# Patient Record
Sex: FEMALE | Race: BLACK OR AFRICAN AMERICAN | NOT HISPANIC OR LATINO | Employment: OTHER | URBAN - METROPOLITAN AREA
[De-identification: names, ages, dates, MRNs, and addresses within clinical notes are randomized per-mention and may not be internally consistent; named-entity substitution may affect disease eponyms.]

---

## 2023-04-04 LAB
ALANINE AMINOTRANSFERASE (SGPT) (U/L) IN SER/PLAS: 11 U/L (ref 7–45)
ALBUMIN (G/DL) IN SER/PLAS: 3.8 G/DL (ref 3.4–5)
ALKALINE PHOSPHATASE (U/L) IN SER/PLAS: 67 U/L (ref 33–136)
ANION GAP IN SER/PLAS: 13 MMOL/L (ref 10–20)
ASPARTATE AMINOTRANSFERASE (SGOT) (U/L) IN SER/PLAS: 15 U/L (ref 9–39)
BASOPHILS (10*3/UL) IN BLOOD BY AUTOMATED COUNT: 0.03 X10E9/L (ref 0–0.1)
BASOPHILS/100 LEUKOCYTES IN BLOOD BY AUTOMATED COUNT: 1.1 % (ref 0–2)
BILIRUBIN DIRECT (MG/DL) IN SER/PLAS: 0.1 MG/DL (ref 0–0.3)
BILIRUBIN TOTAL (MG/DL) IN SER/PLAS: 0.4 MG/DL (ref 0–1.2)
CALCIUM (MG/DL) IN SER/PLAS: 8.6 MG/DL (ref 8.6–10.3)
CARBON DIOXIDE, TOTAL (MMOL/L) IN SER/PLAS: 23 MMOL/L (ref 21–32)
CHLORIDE (MMOL/L) IN SER/PLAS: 110 MMOL/L (ref 98–107)
CHOLESTEROL (MG/DL) IN SER/PLAS: 159 MG/DL (ref 0–199)
CHOLESTEROL IN HDL (MG/DL) IN SER/PLAS: 75.1 MG/DL
CHOLESTEROL/HDL RATIO: 2.1
CREATININE (MG/DL) IN SER/PLAS: 1.35 MG/DL (ref 0.5–1.05)
EOSINOPHILS (10*3/UL) IN BLOOD BY AUTOMATED COUNT: 0.04 X10E9/L (ref 0–0.7)
EOSINOPHILS/100 LEUKOCYTES IN BLOOD BY AUTOMATED COUNT: 1.4 % (ref 0–6)
ERYTHROCYTE DISTRIBUTION WIDTH (RATIO) BY AUTOMATED COUNT: 13.5 % (ref 11.5–14.5)
ERYTHROCYTE MEAN CORPUSCULAR HEMOGLOBIN CONCENTRATION (G/DL) BY AUTOMATED: 31.7 G/DL (ref 32–36)
ERYTHROCYTE MEAN CORPUSCULAR VOLUME (FL) BY AUTOMATED COUNT: 98 FL (ref 80–100)
ERYTHROCYTES (10*6/UL) IN BLOOD BY AUTOMATED COUNT: 3.64 X10E12/L (ref 4–5.2)
GFR FEMALE: 44 ML/MIN/1.73M2
GLUCOSE (MG/DL) IN SER/PLAS: 85 MG/DL (ref 74–99)
HEMATOCRIT (%) IN BLOOD BY AUTOMATED COUNT: 35.6 % (ref 36–46)
HEMOGLOBIN (G/DL) IN BLOOD: 11.3 G/DL (ref 12–16)
IMMATURE GRANULOCYTES/100 LEUKOCYTES IN BLOOD BY AUTOMATED COUNT: 0.4 % (ref 0–0.9)
LDL: 40 MG/DL (ref 0–99)
LEUKOCYTES (10*3/UL) IN BLOOD BY AUTOMATED COUNT: 2.8 X10E9/L (ref 4.4–11.3)
LYMPHOCYTES (10*3/UL) IN BLOOD BY AUTOMATED COUNT: 1.15 X10E9/L (ref 1.2–4.8)
LYMPHOCYTES/100 LEUKOCYTES IN BLOOD BY AUTOMATED COUNT: 40.9 % (ref 13–44)
MONOCYTES (10*3/UL) IN BLOOD BY AUTOMATED COUNT: 0.38 X10E9/L (ref 0.1–1)
MONOCYTES/100 LEUKOCYTES IN BLOOD BY AUTOMATED COUNT: 13.5 % (ref 2–10)
NEUTROPHILS (10*3/UL) IN BLOOD BY AUTOMATED COUNT: 1.2 X10E9/L (ref 1.2–7.7)
NEUTROPHILS/100 LEUKOCYTES IN BLOOD BY AUTOMATED COUNT: 42.7 % (ref 40–80)
NON HDL CHOLESTEROL: 84 MG/DL
PLATELETS (10*3/UL) IN BLOOD AUTOMATED COUNT: 156 X10E9/L (ref 150–450)
POTASSIUM (MMOL/L) IN SER/PLAS: 4.2 MMOL/L (ref 3.5–5.3)
PROTEIN TOTAL: 6.5 G/DL (ref 6.4–8.2)
SODIUM (MMOL/L) IN SER/PLAS: 142 MMOL/L (ref 136–145)
THYROTROPIN (MIU/L) IN SER/PLAS BY DETECTION LIMIT <= 0.05 MIU/L: 0.22 MIU/L (ref 0.44–3.98)
TRIGLYCERIDE (MG/DL) IN SER/PLAS: 222 MG/DL (ref 0–149)
UREA NITROGEN (MG/DL) IN SER/PLAS: 26 MG/DL (ref 6–23)
VLDL: 44 MG/DL (ref 0–40)

## 2023-04-28 LAB
ANION GAP IN SER/PLAS: 12 MMOL/L (ref 10–20)
BASOPHILS (10*3/UL) IN BLOOD BY AUTOMATED COUNT: 0.03 X10E9/L (ref 0–0.1)
BASOPHILS/100 LEUKOCYTES IN BLOOD BY AUTOMATED COUNT: 0.7 % (ref 0–2)
CALCIUM (MG/DL) IN SER/PLAS: 9 MG/DL (ref 8.6–10.3)
CARBON DIOXIDE, TOTAL (MMOL/L) IN SER/PLAS: 27 MMOL/L (ref 21–32)
CHLORIDE (MMOL/L) IN SER/PLAS: 104 MMOL/L (ref 98–107)
CREATININE (MG/DL) IN SER/PLAS: 1.17 MG/DL (ref 0.5–1.05)
EOSINOPHILS (10*3/UL) IN BLOOD BY AUTOMATED COUNT: 0.01 X10E9/L (ref 0–0.7)
EOSINOPHILS/100 LEUKOCYTES IN BLOOD BY AUTOMATED COUNT: 0.2 % (ref 0–6)
ERYTHROCYTE DISTRIBUTION WIDTH (RATIO) BY AUTOMATED COUNT: 13.2 % (ref 11.5–14.5)
ERYTHROCYTE MEAN CORPUSCULAR HEMOGLOBIN CONCENTRATION (G/DL) BY AUTOMATED: 32.2 G/DL (ref 32–36)
ERYTHROCYTE MEAN CORPUSCULAR VOLUME (FL) BY AUTOMATED COUNT: 96 FL (ref 80–100)
ERYTHROCYTES (10*6/UL) IN BLOOD BY AUTOMATED COUNT: 3.85 X10E12/L (ref 4–5.2)
GFR FEMALE: 52 ML/MIN/1.73M2
GLUCOSE (MG/DL) IN SER/PLAS: 127 MG/DL (ref 74–99)
HEMATOCRIT (%) IN BLOOD BY AUTOMATED COUNT: 36.9 % (ref 36–46)
HEMOGLOBIN (G/DL) IN BLOOD: 11.9 G/DL (ref 12–16)
IMMATURE GRANULOCYTES/100 LEUKOCYTES IN BLOOD BY AUTOMATED COUNT: 0.2 % (ref 0–0.9)
LEUKOCYTES (10*3/UL) IN BLOOD BY AUTOMATED COUNT: 4.2 X10E9/L (ref 4.4–11.3)
LYMPHOCYTES (10*3/UL) IN BLOOD BY AUTOMATED COUNT: 1.35 X10E9/L (ref 1.2–4.8)
LYMPHOCYTES/100 LEUKOCYTES IN BLOOD BY AUTOMATED COUNT: 32.5 % (ref 13–44)
MONOCYTES (10*3/UL) IN BLOOD BY AUTOMATED COUNT: 0.35 X10E9/L (ref 0.1–1)
MONOCYTES/100 LEUKOCYTES IN BLOOD BY AUTOMATED COUNT: 8.4 % (ref 2–10)
NEUTROPHILS (10*3/UL) IN BLOOD BY AUTOMATED COUNT: 2.41 X10E9/L (ref 1.2–7.7)
NEUTROPHILS/100 LEUKOCYTES IN BLOOD BY AUTOMATED COUNT: 58 % (ref 40–80)
PLATELETS (10*3/UL) IN BLOOD AUTOMATED COUNT: 188 X10E9/L (ref 150–450)
POTASSIUM (MMOL/L) IN SER/PLAS: 3.6 MMOL/L (ref 3.5–5.3)
SODIUM (MMOL/L) IN SER/PLAS: 139 MMOL/L (ref 136–145)
THYROTROPIN (MIU/L) IN SER/PLAS BY DETECTION LIMIT <= 0.05 MIU/L: 0.9 MIU/L (ref 0.44–3.98)
UREA NITROGEN (MG/DL) IN SER/PLAS: 19 MG/DL (ref 6–23)

## 2023-08-24 LAB
ANION GAP IN SER/PLAS: 12 MMOL/L (ref 10–20)
CALCIUM (MG/DL) IN SER/PLAS: 8.8 MG/DL (ref 8.6–10.3)
CARBON DIOXIDE, TOTAL (MMOL/L) IN SER/PLAS: 23 MMOL/L (ref 21–32)
CHLORIDE (MMOL/L) IN SER/PLAS: 107 MMOL/L (ref 98–107)
CREATININE (MG/DL) IN SER/PLAS: 1.07 MG/DL (ref 0.5–1.05)
GFR FEMALE: 57 ML/MIN/1.73M2
GLUCOSE (MG/DL) IN SER/PLAS: 98 MG/DL (ref 74–99)
POTASSIUM (MMOL/L) IN SER/PLAS: 3.7 MMOL/L (ref 3.5–5.3)
SODIUM (MMOL/L) IN SER/PLAS: 138 MMOL/L (ref 136–145)
UREA NITROGEN (MG/DL) IN SER/PLAS: 19 MG/DL (ref 6–23)

## 2023-08-25 LAB
ALANINE AMINOTRANSFERASE (SGPT) (U/L) IN SER/PLAS: 7 U/L (ref 7–45)
ALBUMIN (G/DL) IN SER/PLAS: 3.7 G/DL (ref 3.4–5)
ALKALINE PHOSPHATASE (U/L) IN SER/PLAS: 60 U/L (ref 33–136)
ANION GAP IN SER/PLAS: 13 MMOL/L (ref 10–20)
ASPARTATE AMINOTRANSFERASE (SGOT) (U/L) IN SER/PLAS: 12 U/L (ref 9–39)
BASOPHILS (10*3/UL) IN BLOOD BY AUTOMATED COUNT: 0.02 X10E9/L (ref 0–0.1)
BASOPHILS/100 LEUKOCYTES IN BLOOD BY AUTOMATED COUNT: 0.4 % (ref 0–2)
BILIRUBIN DIRECT (MG/DL) IN SER/PLAS: 0.2 MG/DL (ref 0–0.3)
BILIRUBIN TOTAL (MG/DL) IN SER/PLAS: 0.8 MG/DL (ref 0–1.2)
CALCIUM (MG/DL) IN SER/PLAS: 8.6 MG/DL (ref 8.6–10.3)
CARBON DIOXIDE, TOTAL (MMOL/L) IN SER/PLAS: 23 MMOL/L (ref 21–32)
CHLORIDE (MMOL/L) IN SER/PLAS: 106 MMOL/L (ref 98–107)
CHOLESTEROL (MG/DL) IN SER/PLAS: 148 MG/DL (ref 0–199)
CHOLESTEROL IN HDL (MG/DL) IN SER/PLAS: 64.8 MG/DL
CHOLESTEROL/HDL RATIO: 2.3
CREATININE (MG/DL) IN SER/PLAS: 0.97 MG/DL (ref 0.5–1.05)
EOSINOPHILS (10*3/UL) IN BLOOD BY AUTOMATED COUNT: 0.07 X10E9/L (ref 0–0.7)
EOSINOPHILS/100 LEUKOCYTES IN BLOOD BY AUTOMATED COUNT: 1.5 % (ref 0–6)
ERYTHROCYTE DISTRIBUTION WIDTH (RATIO) BY AUTOMATED COUNT: 13.2 % (ref 11.5–14.5)
ERYTHROCYTE MEAN CORPUSCULAR HEMOGLOBIN CONCENTRATION (G/DL) BY AUTOMATED: 32.4 G/DL (ref 32–36)
ERYTHROCYTE MEAN CORPUSCULAR VOLUME (FL) BY AUTOMATED COUNT: 98 FL (ref 80–100)
ERYTHROCYTES (10*6/UL) IN BLOOD BY AUTOMATED COUNT: 3.69 X10E12/L (ref 4–5.2)
GFR FEMALE: 65 ML/MIN/1.73M2
GLUCOSE (MG/DL) IN SER/PLAS: 116 MG/DL (ref 74–99)
HEMATOCRIT (%) IN BLOOD BY AUTOMATED COUNT: 36.1 % (ref 36–46)
HEMOGLOBIN (G/DL) IN BLOOD: 11.7 G/DL (ref 12–16)
IMMATURE GRANULOCYTES/100 LEUKOCYTES IN BLOOD BY AUTOMATED COUNT: 0.2 % (ref 0–0.9)
LDL: 66 MG/DL (ref 0–99)
LEUKOCYTES (10*3/UL) IN BLOOD BY AUTOMATED COUNT: 4.7 X10E9/L (ref 4.4–11.3)
LYMPHOCYTES (10*3/UL) IN BLOOD BY AUTOMATED COUNT: 1.58 X10E9/L (ref 1.2–4.8)
LYMPHOCYTES/100 LEUKOCYTES IN BLOOD BY AUTOMATED COUNT: 33.5 % (ref 13–44)
MONOCYTES (10*3/UL) IN BLOOD BY AUTOMATED COUNT: 0.5 X10E9/L (ref 0.1–1)
MONOCYTES/100 LEUKOCYTES IN BLOOD BY AUTOMATED COUNT: 10.6 % (ref 2–10)
NEUTROPHILS (10*3/UL) IN BLOOD BY AUTOMATED COUNT: 2.53 X10E9/L (ref 1.2–7.7)
NEUTROPHILS/100 LEUKOCYTES IN BLOOD BY AUTOMATED COUNT: 53.8 % (ref 40–80)
PLATELETS (10*3/UL) IN BLOOD AUTOMATED COUNT: 162 X10E9/L (ref 150–450)
POTASSIUM (MMOL/L) IN SER/PLAS: 3.6 MMOL/L (ref 3.5–5.3)
PROTEIN TOTAL: 6.4 G/DL (ref 6.4–8.2)
SODIUM (MMOL/L) IN SER/PLAS: 138 MMOL/L (ref 136–145)
THYROTROPIN (MIU/L) IN SER/PLAS BY DETECTION LIMIT <= 0.05 MIU/L: 1.1 MIU/L (ref 0.44–3.98)
TRIGLYCERIDE (MG/DL) IN SER/PLAS: 88 MG/DL (ref 0–149)
UREA NITROGEN (MG/DL) IN SER/PLAS: 18 MG/DL (ref 6–23)
VLDL: 18 MG/DL (ref 0–40)

## 2023-12-04 PROBLEM — I10 HYPERTENSION: Status: ACTIVE | Noted: 2023-12-04

## 2023-12-04 PROBLEM — R10.2 FEMALE PELVIC PAIN: Status: ACTIVE | Noted: 2023-12-04

## 2023-12-04 PROBLEM — K58.0 IRRITABLE BOWEL SYNDROME WITH DIARRHEA: Status: ACTIVE | Noted: 2023-12-04

## 2023-12-04 PROBLEM — R93.1 ELEVATED CORONARY ARTERY CALCIUM SCORE: Status: ACTIVE | Noted: 2023-12-04

## 2023-12-04 PROBLEM — M54.42 ACUTE BILATERAL LOW BACK PAIN WITH BILATERAL SCIATICA: Status: ACTIVE | Noted: 2023-12-04

## 2023-12-04 PROBLEM — H25.812 COMBINED FORM OF AGE-RELATED CATARACT, LEFT EYE: Status: ACTIVE | Noted: 2023-12-04

## 2023-12-04 PROBLEM — L70.0 ACNE VULGARIS: Status: ACTIVE | Noted: 2022-02-09

## 2023-12-04 PROBLEM — Z90.13 S/P BILATERAL MASTECTOMY: Status: ACTIVE | Noted: 2023-12-04

## 2023-12-04 PROBLEM — Z98.82 HISTORY OF BILATERAL BREAST IMPLANTS: Status: ACTIVE | Noted: 2023-12-04

## 2023-12-04 PROBLEM — H40.003 GLAUCOMA SUSPECT OF BOTH EYES: Status: ACTIVE | Noted: 2023-12-04

## 2023-12-04 PROBLEM — I34.0 MITRAL REGURGITATION: Status: ACTIVE | Noted: 2023-12-04

## 2023-12-04 PROBLEM — R10.9 ABDOMINAL PAIN: Status: ACTIVE | Noted: 2023-12-04

## 2023-12-04 PROBLEM — R19.7 DIARRHEA: Status: ACTIVE | Noted: 2023-12-04

## 2023-12-04 PROBLEM — H25.811 COMBINED FORM OF AGE-RELATED CATARACT, RIGHT EYE: Status: ACTIVE | Noted: 2023-12-04

## 2023-12-04 PROBLEM — R06.09 DYSPNEA ON EXERTION: Status: ACTIVE | Noted: 2023-12-04

## 2023-12-04 PROBLEM — M54.41 ACUTE BILATERAL LOW BACK PAIN WITH BILATERAL SCIATICA: Status: ACTIVE | Noted: 2023-12-04

## 2023-12-04 PROBLEM — D22.39 MELANOCYTIC NEVI OF OTHER PARTS OF FACE: Status: ACTIVE | Noted: 2022-02-09

## 2023-12-04 PROBLEM — H04.123 DRY EYES, BILATERAL: Status: ACTIVE | Noted: 2023-12-04

## 2023-12-04 PROBLEM — J34.89 NASAL CRUSTING: Status: ACTIVE | Noted: 2023-12-04

## 2023-12-04 PROBLEM — K52.9 CHRONIC DIARRHEA: Status: ACTIVE | Noted: 2023-12-04

## 2023-12-04 PROBLEM — D22.30 NEVUS OF OTA: Status: ACTIVE | Noted: 2023-12-04

## 2023-12-04 PROBLEM — S33.5XXA LUMBAR SPRAIN: Status: ACTIVE | Noted: 2023-12-04

## 2023-12-04 PROBLEM — R04.0 EPISTAXIS: Status: ACTIVE | Noted: 2023-12-04

## 2023-12-04 PROBLEM — Z96.1 PSEUDOPHAKIA OF RIGHT EYE: Status: ACTIVE | Noted: 2023-12-04

## 2023-12-04 PROBLEM — Z96.1 PSEUDOPHAKIA OF LEFT EYE: Status: ACTIVE | Noted: 2023-12-04

## 2023-12-04 PROBLEM — N60.19 BREAST CHANGES, FIBROCYSTIC: Status: ACTIVE | Noted: 2023-12-04

## 2023-12-04 PROBLEM — H52.209 ASTIGMATISM: Status: ACTIVE | Noted: 2023-12-04

## 2023-12-04 PROBLEM — H52.10 MYOPIA: Status: ACTIVE | Noted: 2023-12-04

## 2023-12-04 PROBLEM — I50.30 DIASTOLIC CHF (MULTI): Status: ACTIVE | Noted: 2023-12-04

## 2023-12-04 PROBLEM — R51.9 HEADACHE: Status: ACTIVE | Noted: 2023-12-04

## 2023-12-04 PROBLEM — K29.70 GASTRITIS: Status: ACTIVE | Noted: 2023-12-04

## 2023-12-04 PROBLEM — Z15.01: Status: ACTIVE | Noted: 2023-12-04

## 2023-12-04 PROBLEM — H35.369 RETINAL DRUSEN: Status: ACTIVE | Noted: 2023-12-04

## 2023-12-04 PROBLEM — M62.89 PELVIC FLOOR DYSFUNCTION: Status: ACTIVE | Noted: 2023-12-04

## 2023-12-04 PROBLEM — N95.2 VAGINAL ATROPHY: Status: ACTIVE | Noted: 2023-12-04

## 2023-12-04 PROBLEM — Z15.09: Status: ACTIVE | Noted: 2023-12-04

## 2023-12-04 PROBLEM — N39.0 RECURRENT UTI: Status: ACTIVE | Noted: 2023-12-04

## 2023-12-04 PROBLEM — N30.01 ACUTE CYSTITIS WITH HEMATURIA: Status: ACTIVE | Noted: 2023-12-04

## 2023-12-04 RX ORDER — CLONIDINE 0.3 MG/24H
1 PATCH, EXTENDED RELEASE TRANSDERMAL
COMMUNITY
Start: 2018-03-01

## 2023-12-04 RX ORDER — ESTRADIOL 10 UG/1
10 INSERT VAGINAL DAILY
COMMUNITY
Start: 2023-09-27 | End: 2024-03-06 | Stop reason: SDUPTHER

## 2023-12-04 RX ORDER — METOPROLOL SUCCINATE 100 MG/1
100 TABLET, EXTENDED RELEASE ORAL DAILY
COMMUNITY
Start: 2014-12-17

## 2023-12-04 RX ORDER — HYDROCORTISONE 25 MG/G
1 CREAM TOPICAL AS NEEDED
COMMUNITY
Start: 2023-08-31

## 2023-12-04 RX ORDER — NAPROXEN 500 MG/1
500 TABLET ORAL EVERY 12 HOURS
COMMUNITY
Start: 2022-06-02

## 2023-12-04 RX ORDER — PREDNISONE 10 MG/1
10 TABLET ORAL DAILY
COMMUNITY
Start: 2023-03-15

## 2023-12-04 RX ORDER — TRETINOIN 0.25 MG/G
1 CREAM TOPICAL NIGHTLY
COMMUNITY
Start: 2022-02-09

## 2023-12-04 RX ORDER — AMLODIPINE BESYLATE 10 MG/1
10 TABLET ORAL DAILY
COMMUNITY
End: 2023-12-11

## 2023-12-04 RX ORDER — CHOLECALCIFEROL (VITAMIN D3) 50 MCG
2000 TABLET ORAL DAILY
COMMUNITY
Start: 2019-04-16

## 2023-12-09 DIAGNOSIS — I10 HYPERTENSION, UNSPECIFIED TYPE: Primary | ICD-10-CM

## 2023-12-11 ENCOUNTER — TELEPHONE (OUTPATIENT)
Dept: OBSTETRICS AND GYNECOLOGY | Facility: CLINIC | Age: 68
End: 2023-12-11
Payer: COMMERCIAL

## 2023-12-11 RX ORDER — AMLODIPINE BESYLATE 10 MG/1
10 TABLET ORAL DAILY
Qty: 90 TABLET | Refills: 0 | Status: SHIPPED | OUTPATIENT
Start: 2023-12-11
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
## 2023-12-14 ENCOUNTER — APPOINTMENT (OUTPATIENT)
Dept: SURGICAL ONCOLOGY | Facility: CLINIC | Age: 68
End: 2023-12-14
Payer: COMMERCIAL

## 2023-12-15 ENCOUNTER — OFFICE VISIT (OUTPATIENT)
Dept: SURGICAL ONCOLOGY | Facility: CLINIC | Age: 68
End: 2023-12-15
Payer: COMMERCIAL

## 2023-12-15 ENCOUNTER — OFFICE VISIT (OUTPATIENT)
Dept: CARDIOLOGY | Facility: CLINIC | Age: 68
End: 2023-12-15
Payer: COMMERCIAL

## 2023-12-15 VITALS
HEIGHT: 67 IN | OXYGEN SATURATION: 98 % | WEIGHT: 168 LBS | HEART RATE: 69 BPM | SYSTOLIC BLOOD PRESSURE: 110 MMHG | BODY MASS INDEX: 26.37 KG/M2 | DIASTOLIC BLOOD PRESSURE: 64 MMHG

## 2023-12-15 VITALS
SYSTOLIC BLOOD PRESSURE: 127 MMHG | DIASTOLIC BLOOD PRESSURE: 82 MMHG | WEIGHT: 170.75 LBS | HEART RATE: 73 BPM | HEIGHT: 67 IN | BODY MASS INDEX: 26.8 KG/M2

## 2023-12-15 DIAGNOSIS — Z15.09 BRCA2 POSITIVE: Primary | ICD-10-CM

## 2023-12-15 DIAGNOSIS — Z15.01 BRCA2 POSITIVE: Primary | ICD-10-CM

## 2023-12-15 DIAGNOSIS — I50.32 CHRONIC DIASTOLIC CONGESTIVE HEART FAILURE (MULTI): ICD-10-CM

## 2023-12-15 DIAGNOSIS — I10 WELL-CONTROLLED HYPERTENSION: Primary | ICD-10-CM

## 2023-12-15 PROBLEM — D25.9 UTERINE LEIOMYOMA: Status: ACTIVE | Noted: 2023-12-15

## 2023-12-15 PROBLEM — N18.30 CKD (CHRONIC KIDNEY DISEASE) STAGE 3, GFR 30-59 ML/MIN (MULTI): Status: ACTIVE | Noted: 2018-03-12

## 2023-12-15 PROCEDURE — 99213 OFFICE O/P EST LOW 20 MIN: CPT | Performed by: NURSE PRACTITIONER

## 2023-12-15 PROCEDURE — 3078F DIAST BP <80 MM HG: CPT

## 2023-12-15 PROCEDURE — 1036F TOBACCO NON-USER: CPT | Performed by: NURSE PRACTITIONER

## 2023-12-15 PROCEDURE — 1126F AMNT PAIN NOTED NONE PRSNT: CPT | Performed by: NURSE PRACTITIONER

## 2023-12-15 PROCEDURE — 3074F SYST BP LT 130 MM HG: CPT

## 2023-12-15 PROCEDURE — 3079F DIAST BP 80-89 MM HG: CPT | Performed by: NURSE PRACTITIONER

## 2023-12-15 PROCEDURE — 1036F TOBACCO NON-USER: CPT

## 2023-12-15 PROCEDURE — 99214 OFFICE O/P EST MOD 30 MIN: CPT

## 2023-12-15 PROCEDURE — 1159F MED LIST DOCD IN RCRD: CPT | Performed by: NURSE PRACTITIONER

## 2023-12-15 PROCEDURE — 3074F SYST BP LT 130 MM HG: CPT | Performed by: NURSE PRACTITIONER

## 2023-12-15 PROCEDURE — 1125F AMNT PAIN NOTED PAIN PRSNT: CPT

## 2023-12-15 PROCEDURE — 1160F RVW MEDS BY RX/DR IN RCRD: CPT | Performed by: NURSE PRACTITIONER

## 2023-12-15 PROCEDURE — 1159F MED LIST DOCD IN RCRD: CPT

## 2023-12-15 ASSESSMENT — PAIN SCALES - GENERAL: PAINLEVEL: 0-NO PAIN

## 2023-12-15 NOTE — PROGRESS NOTES
"Chief Complaint:   Follow-up     History Of Present Illness:    Dian Gilliam is a 69 y/o with a PMH of HFpEF 70% on echocardiogram 3/9/22, HTN, serous carcinoma of the fallopian tube( post op bowel obstruction).      She reports to feeling well. BP has been well controlled at home. She requested a refill for clonidine patch.  She has not taken it for over 1 week. Patient denies chest pain and angina.  Pt denies shortness of breath, dyspnea on exertion, orthopnea, and paroxysmal nocturnal dyspnea.  Pt denies worsening lower extremity edema.  Pt denies palpitations or syncope.  No recent falls.  No fever or chills.  No cough.  No change in bowel or bladder habits.  No sick contacts.  No recent travel.     ROS: 12 point review of systems including (Constitutional, Eyes, ENMT, Respiratory, Cardiac, Gastrointestinal, Neurological, Psychiatric, and Hematologic) was performed and is otherwise negative unless noted in HPI.         Last Recorded Vitals:  Vitals:    12/15/23 1114   BP: 110/64   Pulse: 69   SpO2: 98%   Weight: 76.2 kg (168 lb)   Height: 1.702 m (5' 7\")       Past Medical History:  She has a past medical history of Encounter for general adult medical examination without abnormal findings (09/18/2021), Malignant neoplasm of unspecified fallopian tube (CMS/HCC) (03/25/2015), Personal history of other benign neoplasm, and Personal history of other diseases of urinary system.    Past Surgical History:  She has a past surgical history that includes Exploratory laparotomy (12/31/2015); Cholecystectomy (12/31/2015); Small intestine surgery (06/03/2019); Total abdominal hysterectomy w/ bilateral salpingoophorectomy (06/03/2019); Other surgical history (03/23/2017); US guided abdominal paracentesis (5/23/2013); and US guided abscess drain (5/28/2013).      Social History:  She reports that she has never smoked. She has never used smokeless tobacco. No history on file for alcohol use and drug use.    Family History:  No " family history on file.     Allergies:  Patient has no known allergies.    Outpatient Medications:  Current Outpatient Medications   Medication Instructions    amLODIPine (NORVASC) 10 mg, oral, Daily    cholecalciferol (VITAMIN D-3) 2,000 Units, oral, Daily    cloNIDine (Catapres-TTS) 0.3 mg/24 hr patch 1 patch, transdermal, Weekly    estradiol (VAGIFEM) 10 mcg, oral, Daily    hydrocortisone 2.5 % cream 1 Application, Topical, As needed    lisinopril 40 mg, oral, Daily    metoprolol succinate XL (TOPROL-XL) 100 mg, oral, Daily    naproxen (NAPROSYN) 500 mg, oral, Every 12 hours    predniSONE (DELTASONE) 10 mg, oral, Daily    tretinoin (Retin-A) 0.025 % cream 1 Application, Topical, Nightly       Physical Exam  Constitutional:       Appearance: Normal appearance.   Neck:      Vascular: No carotid bruit.   Cardiovascular:      Rate and Rhythm: Normal rate and regular rhythm.      Heart sounds: No murmur heard.  Pulmonary:      Effort: Pulmonary effort is normal.      Breath sounds: Normal breath sounds.   Abdominal:      Palpations: Abdomen is soft.   Skin:     General: Skin is warm.   Neurological:      Mental Status: She is alert and oriented to person, place, and time.   Psychiatric:         Mood and Affect: Mood normal.       Last Labs:  CBC -  Lab Results   Component Value Date    WBC 4.7 08/25/2023    HGB 11.7 (L) 08/25/2023    HCT 36.1 08/25/2023    MCV 98 08/25/2023     08/25/2023       CMP -  Lab Results   Component Value Date    CALCIUM 8.6 08/25/2023    PHOS 3.4 10/12/2020    PROT 6.4 08/25/2023    ALBUMIN 3.7 08/25/2023    ALBUMIN 4.2 04/22/2022    AST 12 08/25/2023    ALT 7 08/25/2023    ALKPHOS 60 08/25/2023    BILITOT 0.8 08/25/2023       LIPID PANEL -   Lab Results   Component Value Date    CHOL 148 08/25/2023    TRIG 88 08/25/2023    HDL 64.8 08/25/2023    CHHDL 2.3 08/25/2023    CHHDL 2.0 04/22/2022    LDLF 66 08/25/2023    VLDL 18 08/25/2023    NHDL 84 04/04/2023       RENAL FUNCTION PANEL -    Lab Results   Component Value Date    GLUCOSE 116 (H) 08/25/2023     08/25/2023    K 3.6 08/25/2023     08/25/2023    CO2 23 08/25/2023    ANIONGAP 13 08/25/2023    BUN 18 08/25/2023    CREATININE 0.97 08/25/2023    CALCIUM 8.6 08/25/2023    PHOS 3.4 10/12/2020    ALBUMIN 3.7 08/25/2023    ALBUMIN 4.2 04/22/2022        Lab Results   Component Value Date    HGBA1C 5.2 04/22/2022       Last Cardiology Tests:  Echo:  3/22/2022 CONCLUSIONS:   1. The left ventricular systolic function is normal with a 70% estimated ejection fraction.   2. Spectral Doppler shows an impaired relaxation pattern of left ventricular diastolic filling.     Stress Test:  Normal Exercise Stress Test 2019  Cardiac Imaging:  CT Cardiac Calcium Score 156 5/7/2019    Assessment/Plan   Dian Gilliam is a 67 y/o with a PMH of HFpEF 70% on echocardiogram 3/9/22, HTN, serous carcinoma of the fallopian tube( post op bowel obstruction).      She reports to feeling well. BP has been well controlled at home. She requested a refill for clonidine patch.  She has not taken it for over 1 week. BP is normotensive today . I advised patient that at this time there is not a need for the clonidine and the risk of taking it can cause her to be hypotensive. She appears euvolemic and does not have cardiac complaints.     Continue taking Amlodipine 10 mg daily, lisinopril 40 mg daily, metoprolol succinate 100 mg daily,     Follow up with PCP    Follow up with Cardiology in 1 year      Ailyn Valencia, PEDRO-CNP

## 2023-12-15 NOTE — PATIENT INSTRUCTIONS
Dian,    Stop clonidine 0.3 mg patch at this time BP is 110/64 . No need to restart.    Your cholesterol is controlled     Continue taking Amlodipine 10 mg daily, lisinopril 40 mg daily, metoprolol succinate 100 mg daily,     Follow up with PCP    Follow up with Cardiology in 1 year    Ailyn VASQUEZ-CNP

## 2023-12-17 ASSESSMENT — CUP TO DISC RATIO
OS_RATIO: 0.4
OD_RATIO: .65

## 2023-12-17 ASSESSMENT — SLIT LAMP EXAM - LIDS
COMMENTS: GOOD POSITION
COMMENTS: GOOD POSITION

## 2023-12-17 ASSESSMENT — EXTERNAL EXAM - LEFT EYE: OS_EXAM: NORMAL

## 2023-12-18 ENCOUNTER — OFFICE VISIT (OUTPATIENT)
Dept: OPHTHALMOLOGY | Facility: CLINIC | Age: 68
End: 2023-12-18
Payer: COMMERCIAL

## 2023-12-18 DIAGNOSIS — H52.4 PRESBYOPIA: ICD-10-CM

## 2023-12-18 DIAGNOSIS — H40.003 GLAUCOMA SUSPECT OF BOTH EYES: Primary | ICD-10-CM

## 2023-12-18 DIAGNOSIS — H26.492 PCO (POSTERIOR CAPSULAR OPACIFICATION), LEFT: ICD-10-CM

## 2023-12-18 DIAGNOSIS — H04.123 DRY EYES, BILATERAL: ICD-10-CM

## 2023-12-18 DIAGNOSIS — H52.203 ASTIGMATISM OF BOTH EYES, UNSPECIFIED TYPE: ICD-10-CM

## 2023-12-18 DIAGNOSIS — H35.363 RETINAL DRUSEN OF BOTH EYES: ICD-10-CM

## 2023-12-18 DIAGNOSIS — Z96.1 PSEUDOPHAKIA: ICD-10-CM

## 2023-12-18 DIAGNOSIS — D22.30 NEVUS OF OTA: ICD-10-CM

## 2023-12-18 DIAGNOSIS — H52.13 MYOPIA OF BOTH EYES: ICD-10-CM

## 2023-12-18 PROCEDURE — 92015 DETERMINE REFRACTIVE STATE: CPT | Performed by: OPHTHALMOLOGY

## 2023-12-18 PROCEDURE — 92014 COMPRE OPH EXAM EST PT 1/>: CPT | Performed by: OPHTHALMOLOGY

## 2023-12-18 PROCEDURE — 92133 CPTRZD OPH DX IMG PST SGM ON: CPT | Performed by: OPHTHALMOLOGY

## 2023-12-18 ASSESSMENT — ENCOUNTER SYMPTOMS
CONSTITUTIONAL NEGATIVE: 0
NEUROLOGICAL NEGATIVE: 0
MUSCULOSKELETAL NEGATIVE: 0
GASTROINTESTINAL NEGATIVE: 0
PSYCHIATRIC NEGATIVE: 0
RESPIRATORY NEGATIVE: 0
ALLERGIC/IMMUNOLOGIC NEGATIVE: 0
EYES NEGATIVE: 1
CARDIOVASCULAR NEGATIVE: 0
HEMATOLOGIC/LYMPHATIC NEGATIVE: 0
ENDOCRINE NEGATIVE: 0

## 2023-12-18 ASSESSMENT — REFRACTION_MANIFEST
OD_AXIS: 135
OS_SPHERE: -2.00
OS_AXIS: 085
OD_CYLINDER: -0.50
OD_SPHERE: -1.75
OS_CYLINDER: -0.50
OD_ADD: +2.50
OS_ADD: +2.50

## 2023-12-18 ASSESSMENT — PACHYMETRY
OS_CT(UM): 529
OD_CT(UM): 554

## 2023-12-18 ASSESSMENT — REFRACTION_WEARINGRX
OS_CYLINDER: -0.50
OD_AXIS: 135
OS_AXIS: 085
OS_SPHERE: -2.00
OD_SPHERE: -1.75
OD_CYLINDER: -0.50
OS_ADD: +2.50
OD_ADD: +2.50

## 2023-12-18 ASSESSMENT — VISUAL ACUITY
CORRECTION_TYPE: GLASSES
OS_CC: 20/20
METHOD: SNELLEN - LINEAR
OD_CC: 20/20

## 2023-12-18 ASSESSMENT — TONOMETRY
IOP_METHOD: GOLDMANN APPLANATION
OS_IOP_MMHG: 18
OD_IOP_MMHG: 19

## 2023-12-18 NOTE — PROGRESS NOTES
Glaucoma suspect of both eyesH40.003  Nevus of OtaD22.30  -No known FH of glaucoma.  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 Will monitor.  -Pachymetry (5/20/19) - 554/529  -HVF 24-2 (8/30/21) - OD: 8/14FL 20%FP. WNL. OS: 12/13FL 30%FP 12%FN. WNL. Unreliable OU, but WNL and stable from 5/20/19.   -OCT RNFL (12/18/23) - SS: 6/10 OD and 7/10 OS. OD: WNL. OS: Bord IT. 90/91. Stable from 8/30/20.  -Gonioscopy (08/30/2021) - Open to TM OU, pigment OD>OS.   -Plan: Exam and testing stable. Low suspicion for glaucoma at this time. No treatment necessary. F/u 1 year with comprehensive exam, repeat OCT RNFL OU, HVF 24-2, and gonioscopy OU.    Retinal eulntlS12.369  -OCT macula (12/18/23) - SS: 8/10 OD and 7/10 OS. Normal thickness and contour OU. Intact IS-OS OU. No edema OU. 260/269. Stable from 12/2/22.   -Not visually significant. No elevation/SRF.  -Monitor with annual dilated exam.     Dry eyes, eedmznhwrJ96.123  -Occasional irritation and itching. No history of seasonal allergies. Advised to use artificial tears (Refresh) PRN. Add warm compresses PRN.     Pseudophakia of right eyeZ96.1 - 1/17/19 - Complex with Trypan Blue and Malyugin Ring  PCO (posterior capsular opacification), leftH26.492  Pseudophakia of left eyeZ96.1 - 12/13/18 - Complex with Trypan Blue  -Doing well. Good vision. IOP good.  -Trace PCO OS - stable, not visually significant.     MnmxfjQ40.10  LlrdhjgtpvdB53.209  ThxnmzajzpW73.4  -New Rx given per patient request.

## 2023-12-20 NOTE — TELEPHONE ENCOUNTER
Sent [employer] message and left phone message for Dian Gilliam asking for name of med to be refilled.

## 2024-03-06 ENCOUNTER — TELEPHONE (OUTPATIENT)
Dept: OBSTETRICS AND GYNECOLOGY | Facility: CLINIC | Age: 69
End: 2024-03-06
Payer: COMMERCIAL

## 2024-03-06 DIAGNOSIS — N95.2 VAGINAL ATROPHY: ICD-10-CM

## 2024-03-06 NOTE — TELEPHONE ENCOUNTER
Request received for   Requested Prescriptions     Pending Prescriptions Disp Refills    estradiol (Vagifem) 10 mcg tablet vaginal tablet 90 tablet 0     Sig: Insert 1 tablet (10 mcg) into the vagina once daily.       Dian Gilliam was last seen 8/28/2023 and has an appt for Visit date not found

## 2024-03-07 ENCOUNTER — TELEPHONE (OUTPATIENT)
Dept: OBSTETRICS AND GYNECOLOGY | Facility: CLINIC | Age: 69
End: 2024-03-07
Payer: COMMERCIAL

## 2024-03-07 DIAGNOSIS — N95.2 VAGINAL ATROPHY: ICD-10-CM

## 2024-03-07 RX ORDER — ESTRADIOL 10 UG/1
10 INSERT VAGINAL DAILY
Qty: 90 TABLET | Refills: 0 | Status: SHIPPED | OUTPATIENT
Start: 2024-03-07 | End: 2024-03-07 | Stop reason: SDUPTHER

## 2024-03-07 RX ORDER — ESTRADIOL 10 UG/1
10 INSERT VAGINAL 2 TIMES WEEKLY
Qty: 24 TABLET | Refills: 3 | Status: SHIPPED | OUTPATIENT
Start: 2024-03-07

## 2024-04-17 ENCOUNTER — LAB (OUTPATIENT)
Dept: LAB | Facility: LAB | Age: 69
End: 2024-04-17
Payer: COMMERCIAL

## 2024-04-17 DIAGNOSIS — E78.5 HYPERLIPIDEMIA, UNSPECIFIED: ICD-10-CM

## 2024-04-17 DIAGNOSIS — I10 ESSENTIAL (PRIMARY) HYPERTENSION: Primary | ICD-10-CM

## 2024-04-17 LAB
ALBUMIN SERPL BCP-MCNC: 4 G/DL (ref 3.4–5)
ALP SERPL-CCNC: 88 U/L (ref 33–136)
ALT SERPL W P-5'-P-CCNC: 13 U/L (ref 7–45)
ANION GAP SERPL CALC-SCNC: 15 MMOL/L (ref 10–20)
AST SERPL W P-5'-P-CCNC: 18 U/L (ref 9–39)
BASOPHILS # BLD AUTO: 0.02 X10*3/UL (ref 0–0.1)
BASOPHILS NFR BLD AUTO: 0.5 %
BILIRUB DIRECT SERPL-MCNC: 0.2 MG/DL (ref 0–0.3)
BILIRUB SERPL-MCNC: 1.1 MG/DL (ref 0–1.2)
BUN SERPL-MCNC: 17 MG/DL (ref 6–23)
CALCIUM SERPL-MCNC: 9.1 MG/DL (ref 8.6–10.3)
CHLORIDE SERPL-SCNC: 105 MMOL/L (ref 98–107)
CHOLEST SERPL-MCNC: 152 MG/DL (ref 0–199)
CHOLESTEROL/HDL RATIO: 1.9
CO2 SERPL-SCNC: 22 MMOL/L (ref 21–32)
CREAT SERPL-MCNC: 1.02 MG/DL (ref 0.5–1.05)
EGFRCR SERPLBLD CKD-EPI 2021: 61 ML/MIN/1.73M*2
EOSINOPHIL # BLD AUTO: 0.02 X10*3/UL (ref 0–0.7)
EOSINOPHIL NFR BLD AUTO: 0.5 %
ERYTHROCYTE [DISTWIDTH] IN BLOOD BY AUTOMATED COUNT: 13.7 % (ref 11.5–14.5)
GLUCOSE SERPL-MCNC: 119 MG/DL (ref 74–99)
HCT VFR BLD AUTO: 38.4 % (ref 36–46)
HDLC SERPL-MCNC: 81.6 MG/DL
HGB BLD-MCNC: 12.4 G/DL (ref 12–16)
IMM GRANULOCYTES # BLD AUTO: 0.01 X10*3/UL (ref 0–0.7)
IMM GRANULOCYTES NFR BLD AUTO: 0.3 % (ref 0–0.9)
LDLC SERPL CALC-MCNC: 49 MG/DL
LYMPHOCYTES # BLD AUTO: 1.57 X10*3/UL (ref 1.2–4.8)
LYMPHOCYTES NFR BLD AUTO: 41.1 %
MCH RBC QN AUTO: 29.9 PG (ref 26–34)
MCHC RBC AUTO-ENTMCNC: 32.3 G/DL (ref 32–36)
MCV RBC AUTO: 93 FL (ref 80–100)
MONOCYTES # BLD AUTO: 0.36 X10*3/UL (ref 0.1–1)
MONOCYTES NFR BLD AUTO: 9.4 %
NEUTROPHILS # BLD AUTO: 1.84 X10*3/UL (ref 1.2–7.7)
NEUTROPHILS NFR BLD AUTO: 48.2 %
NON HDL CHOLESTEROL: 70 MG/DL (ref 0–149)
NRBC BLD-RTO: 0 /100 WBCS (ref 0–0)
PLATELET # BLD AUTO: 184 X10*3/UL (ref 150–450)
POTASSIUM SERPL-SCNC: 3.5 MMOL/L (ref 3.5–5.3)
PROT SERPL-MCNC: 6.6 G/DL (ref 6.4–8.2)
RBC # BLD AUTO: 4.15 X10*6/UL (ref 4–5.2)
SODIUM SERPL-SCNC: 138 MMOL/L (ref 136–145)
TRIGL SERPL-MCNC: 105 MG/DL (ref 0–149)
TSH SERPL-ACNC: 0.91 MIU/L (ref 0.44–3.98)
VLDL: 21 MG/DL (ref 0–40)
WBC # BLD AUTO: 3.8 X10*3/UL (ref 4.4–11.3)

## 2024-04-17 PROCEDURE — 80061 LIPID PANEL: CPT

## 2024-04-17 PROCEDURE — 85025 COMPLETE CBC W/AUTO DIFF WBC: CPT

## 2024-04-17 PROCEDURE — 82248 BILIRUBIN DIRECT: CPT

## 2024-04-17 PROCEDURE — 80053 COMPREHEN METABOLIC PANEL: CPT

## 2024-04-17 PROCEDURE — 84443 ASSAY THYROID STIM HORMONE: CPT

## 2024-04-17 PROCEDURE — 36415 COLL VENOUS BLD VENIPUNCTURE: CPT

## 2024-05-09 ENCOUNTER — APPOINTMENT (OUTPATIENT)
Dept: GYNECOLOGIC ONCOLOGY | Facility: CLINIC | Age: 69
End: 2024-05-09
Payer: COMMERCIAL

## 2024-05-30 ENCOUNTER — OFFICE VISIT (OUTPATIENT)
Dept: GYNECOLOGIC ONCOLOGY | Facility: CLINIC | Age: 69
End: 2024-05-30
Payer: COMMERCIAL

## 2024-05-30 VITALS
TEMPERATURE: 98.4 F | DIASTOLIC BLOOD PRESSURE: 88 MMHG | BODY MASS INDEX: 26.95 KG/M2 | HEART RATE: 66 BPM | OXYGEN SATURATION: 96 % | WEIGHT: 172.1 LBS | RESPIRATION RATE: 18 BRPM | SYSTOLIC BLOOD PRESSURE: 135 MMHG

## 2024-05-30 DIAGNOSIS — K52.9 CHRONIC DIARRHEA: ICD-10-CM

## 2024-05-30 DIAGNOSIS — C57.00 CARCINOMA OF FALLOPIAN TUBE, UNSPECIFIED LATERALITY (MULTI): Primary | ICD-10-CM

## 2024-05-30 DIAGNOSIS — Z15.09 BRCA2 POSITIVE: ICD-10-CM

## 2024-05-30 DIAGNOSIS — Z15.01 BRCA2 POSITIVE: ICD-10-CM

## 2024-05-30 DIAGNOSIS — Z08 ENCOUNTER FOR FOLLOW-UP SURVEILLANCE OF OVARIAN CANCER: ICD-10-CM

## 2024-05-30 DIAGNOSIS — Z85.43 ENCOUNTER FOR FOLLOW-UP SURVEILLANCE OF OVARIAN CANCER: ICD-10-CM

## 2024-05-30 PROCEDURE — 1126F AMNT PAIN NOTED NONE PRSNT: CPT | Performed by: STUDENT IN AN ORGANIZED HEALTH CARE EDUCATION/TRAINING PROGRAM

## 2024-05-30 PROCEDURE — 1160F RVW MEDS BY RX/DR IN RCRD: CPT | Performed by: STUDENT IN AN ORGANIZED HEALTH CARE EDUCATION/TRAINING PROGRAM

## 2024-05-30 PROCEDURE — 1159F MED LIST DOCD IN RCRD: CPT | Performed by: STUDENT IN AN ORGANIZED HEALTH CARE EDUCATION/TRAINING PROGRAM

## 2024-05-30 PROCEDURE — 99214 OFFICE O/P EST MOD 30 MIN: CPT | Performed by: STUDENT IN AN ORGANIZED HEALTH CARE EDUCATION/TRAINING PROGRAM

## 2024-05-30 PROCEDURE — 3079F DIAST BP 80-89 MM HG: CPT | Performed by: STUDENT IN AN ORGANIZED HEALTH CARE EDUCATION/TRAINING PROGRAM

## 2024-05-30 PROCEDURE — 3075F SYST BP GE 130 - 139MM HG: CPT | Performed by: STUDENT IN AN ORGANIZED HEALTH CARE EDUCATION/TRAINING PROGRAM

## 2024-05-30 ASSESSMENT — PAIN SCALES - GENERAL: PAINLEVEL: 0-NO PAIN
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
## 2024-09-16 ENCOUNTER — OFFICE VISIT (OUTPATIENT)
Dept: OBSTETRICS AND GYNECOLOGY | Facility: CLINIC | Age: 69
End: 2024-09-16
Payer: COMMERCIAL

## 2024-09-16 VITALS
SYSTOLIC BLOOD PRESSURE: 97 MMHG | HEIGHT: 67 IN | HEART RATE: 77 BPM | BODY MASS INDEX: 26.4 KG/M2 | DIASTOLIC BLOOD PRESSURE: 77 MMHG | WEIGHT: 168.2 LBS

## 2024-09-16 DIAGNOSIS — R19.7 DIARRHEA, UNSPECIFIED TYPE: ICD-10-CM

## 2024-09-16 DIAGNOSIS — N39.9 URINARY DISORDER: Primary | ICD-10-CM

## 2024-09-16 DIAGNOSIS — N95.2 VAGINAL ATROPHY: ICD-10-CM

## 2024-09-16 PROCEDURE — 1159F MED LIST DOCD IN RCRD: CPT | Performed by: OBSTETRICS & GYNECOLOGY

## 2024-09-16 PROCEDURE — 3078F DIAST BP <80 MM HG: CPT | Performed by: OBSTETRICS & GYNECOLOGY

## 2024-09-16 PROCEDURE — 1126F AMNT PAIN NOTED NONE PRSNT: CPT | Performed by: OBSTETRICS & GYNECOLOGY

## 2024-09-16 PROCEDURE — 99214 OFFICE O/P EST MOD 30 MIN: CPT | Performed by: OBSTETRICS & GYNECOLOGY

## 2024-09-16 PROCEDURE — 3008F BODY MASS INDEX DOCD: CPT | Performed by: OBSTETRICS & GYNECOLOGY

## 2024-09-16 PROCEDURE — 3074F SYST BP LT 130 MM HG: CPT | Performed by: OBSTETRICS & GYNECOLOGY

## 2024-09-16 RX ORDER — ESTRADIOL 10 UG/1
10 INSERT VAGINAL 2 TIMES WEEKLY
Qty: 24 TABLET | Refills: 3 | Status: SHIPPED | OUTPATIENT
Start: 2024-09-16

## 2024-09-16 RX ORDER — DIPHENOXYLATE HYDROCHLORIDE AND ATROPINE SULFATE 2.5; .025 MG/1; MG/1
1 TABLET ORAL 2 TIMES DAILY PRN
Qty: 60 TABLET | Refills: 1 | Status: SHIPPED | OUTPATIENT
Start: 2024-09-16 | End: 2024-10-16

## 2024-09-16 ASSESSMENT — ENCOUNTER SYMPTOMS
MUSCULOSKELETAL NEGATIVE: 1
ENDOCRINE NEGATIVE: 1
EYES NEGATIVE: 1
CONSTITUTIONAL NEGATIVE: 1
CARDIOVASCULAR NEGATIVE: 1
NEUROLOGICAL NEGATIVE: 1
NAUSEA: 0
ABDOMINAL PAIN: 0
PSYCHIATRIC NEGATIVE: 1
RESPIRATORY NEGATIVE: 1
VOMITING: 0
DIARRHEA: 1

## 2024-09-16 ASSESSMENT — PAIN SCALES - GENERAL: PAINLEVEL: 0-NO PAIN

## 2024-10-19 NOTE — PROGRESS NOTES
Urogynecology  Provider:  Ronda Butts MD  [phone]        ASSESSMENT AND PLAN:   68 y.o. female being assessed for chronic diarrhea and vaginal atrophy.     Diagnoses:   #1 Chronic diarrhea     #2 Vaginal atrophy    Plan:   1. Chronic diarrhea  - She was given a rx for 3 month supply of Lomotil.    - Continue to follow up q3 months as this rx is a controlled substance.     2. Vaginal atrophy   - Continue Vagifem use; insert 1 tablet twice per week.      Follow-up in 3 months with Dr. Butts.     Phone call visit today: The patient's identity was confirmed and that she is located in [place]. Ronda Butts MD identified herself and the patient consented to a telehealth visit today. Phone call time: 5 minutes    Scribe Attestation:   IJoy, am scribing for virtually, and in the presence of Ronda Butts MD on 10/21/2024 at 3:32 PM.     Agree with above. I Dr. Butts, personally performed the services described in the documentation which was scribed virtually and confirm it is both complete and accurate.  Ronda Butts MD      Problem List Items Addressed This Visit    None          I spent a total of 10 minutes in face to face and non face to face time at this virtual visit.          Ronda Butts MD        HISTORY OF PRESENT ILLNESS:   Dian Gilliam is a 68 y.o. female who presents for follow up on diarrhea and vaginal atrophy.     Record Review:   Last visit 9/2024  Diagnoses:  #1 Chronic diarrhea  #2 Vaginal atrophy  Plan:  Chronic diarrhea  - Start on Lomotil 1 tablet once daily, may increase to twice daily if needed after 1-2 weeks. Rx sent to the patient's preferred pharmacy.  - Discussed the need for a consent form due to the restricted nature of Lomotil.  - Controlled substance consent signed today for Lomotil  2. Vaginal atrophy, pelvic exam  - Rx for Vagifem sent to patient's preferred pharmacy for one year to correspond with the yearly appointment.  - Aa -3, C -9, Ap  -1  - Aa -3  C -9  TVL 10 Ap -1 D -10      Vaginal Symptoms:  - Continues to use Vagifem.      Bowel Symptoms:   - Lomotil is helping with the diarrhea. She's noticed a significant improvement.   - Patient has started getting out of the house more.   - Takes Lomotil BID. Denies constipation.      Past Medical History:       Past Medical History:   Diagnosis Date    Cataract     Dry eyes     Encounter for general adult medical examination without abnormal findings 09/18/2021    Encounter for preventive health examination    Glaucoma suspect of both eyes     Malignant neoplasm of unspecified fallopian tube (Multi) 03/25/2015    Fallopian tube carcinoma    Nevus of Blayne     Personal history of other benign neoplasm     History of uterine leiomyoma    Personal history of other diseases of urinary system     History of renal failure    PVD (posterior vitreous detachment), right eye     Retinal drusen of both eyes           Past Surgical History:       Past Surgical History:   Procedure Laterality Date    CATARACT EXTRACTION      CHOLECYSTECTOMY  12/31/2015    Cholecystectomy Laparoscopic    EXPLORATORY LAPAROTOMY  12/31/2015    Exploratory Laparotomy    OTHER SURGICAL HISTORY  03/23/2017    Breast Reconstruction With Tissue Expander Bilateral    SMALL INTESTINE SURGERY  06/03/2019    Small Bowel Resection    TOTAL ABDOMINAL HYSTERECTOMY W/ BILATERAL SALPINGOOPHORECTOMY  06/03/2019    Total Abdominal Hysterectomy With Removal Of Both Ovaries    US GUIDED ABDOMINAL PARACENTESIS  05/23/2013    US GUIDED ABDOMINAL PARACENTESIS 5/23/2013 [facility] CLINICAL LEGACY    US GUIDED ABSCESS DRAIN  05/28/2013    US GUIDED ABSCESS DRAIN 5/28/2013 [facility] CLINICAL LEGACY         Medications:       Prior to Admission medications    Medication Sig Start Date End Date Taking? Authorizing Provider   amLODIPine (Norvasc) 10 mg tablet TAKE ONE TABLET BY MOUTH EVERY DAY 12/11/23   PEDRO Mattson-CNP   cholecalciferol (Vitamin D-3) 50 MCG  "(2000 UT) tablet Take 1 tablet (2,000 Units) by mouth once daily. 4/16/19   Historical Provider, MD   cloNIDine (Catapres-TTS) 0.3 mg/24 hr patch Place 1 patch on the skin 1 (one) time per week. 3/1/18   Historical Provider, MD   diphenoxylate-atropine (LomotiL) 2.5-0.025 mg tablet Take 1 tablet by mouth 2 times a day as needed for diarrhea. 9/16/24 10/16/24  Ronda Butts MD   estradiol (Vagifem) 10 mcg tablet vaginal tablet Insert 1 tablet (10 mcg) into the vagina 2 times a week. 9/16/24   Ronda Butts MD   hydrocortisone 2.5 % cream Apply 1 Application topically if needed. 8/31/23   Historical Provider, MD   lisinopril 40 mg tablet TAKE ONE TABLET BY MOUTH EVERY DAY 10/6/23   Ailyn Valencia, APRN-CNP   metoprolol succinate XL (Toprol-XL) 100 mg 24 hr tablet Take 1 tablet (100 mg) by mouth once daily. 12/17/14   Historical Provider, MD   naproxen (Naprosyn) 500 mg tablet Take 1 tablet (500 mg) by mouth every 12 hours. 6/2/22   Historical Provider, MD   predniSONE (Deltasone) 10 mg tablet Take 1 tablet (10 mg) by mouth once daily. 3/15/23   Historical Provider, MD   tretinoin (Retin-A) 0.025 % cream Apply 1 Application topically once daily at bedtime. 2/9/22   Historical Provider, MD RAVI  Review of Systems       Data and DIAGNOSTIC STUDIES REVIEWED   No results found.   No results found for: \"URINECULTURE\", \"UAMICCOMM\"   Lab Results   Component Value Date    GLUCOSE 119 (H) 04/17/2024    CALCIUM 9.1 04/17/2024     04/17/2024    K 3.5 04/17/2024    CO2 22 04/17/2024     04/17/2024    BUN 17 04/17/2024    CREATININE 1.02 04/17/2024     Lab Results   Component Value Date    WBC 3.8 (L) 04/17/2024    HGB 12.4 04/17/2024    HCT 38.4 04/17/2024    MCV 93 04/17/2024     04/17/2024            "

## 2024-10-21 ENCOUNTER — TELEMEDICINE (OUTPATIENT)
Dept: OBSTETRICS AND GYNECOLOGY | Facility: CLINIC | Age: 69
End: 2024-10-21
Payer: COMMERCIAL

## 2024-10-21 DIAGNOSIS — R19.7 DIARRHEA, UNSPECIFIED TYPE: ICD-10-CM

## 2024-10-21 PROCEDURE — 99212 OFFICE O/P EST SF 10 MIN: CPT | Performed by: OBSTETRICS & GYNECOLOGY

## 2024-10-21 RX ORDER — DIPHENOXYLATE HYDROCHLORIDE AND ATROPINE SULFATE 2.5; .025 MG/1; MG/1
1 TABLET ORAL 2 TIMES DAILY
Qty: 60 TABLET | Refills: 1 | Status: SHIPPED | OUTPATIENT
Start: 2024-10-21 | End: 2024-11-20

## 2024-10-25 ENCOUNTER — OFFICE VISIT (OUTPATIENT)
Dept: OPHTHALMOLOGY | Facility: CLINIC | Age: 69
End: 2024-10-25
Payer: COMMERCIAL

## 2024-10-25 DIAGNOSIS — H43.813 VITREOUS DEGENERATION OF BOTH EYES: Primary | ICD-10-CM

## 2024-10-25 DIAGNOSIS — Z96.1 PSEUDOPHAKIA: ICD-10-CM

## 2024-10-25 PROCEDURE — 99203 OFFICE O/P NEW LOW 30 MIN: CPT | Performed by: OPTOMETRIST

## 2024-10-25 ASSESSMENT — REFRACTION_WEARINGRX
OD_ADD: +2.50
OS_CYLINDER: -0.50
OD_SPHERE: -1.75
OS_AXIS: 085
OS_SPHERE: -2.00
OD_CYLINDER: -0.50
OS_ADD: +2.50
OD_AXIS: 135

## 2024-10-25 ASSESSMENT — CONF VISUAL FIELD
OS_SUPERIOR_TEMPORAL_RESTRICTION: 0
OD_SUPERIOR_TEMPORAL_RESTRICTION: 0
OS_SUPERIOR_NASAL_RESTRICTION: 0
OD_INFERIOR_NASAL_RESTRICTION: 0
OS_INFERIOR_TEMPORAL_RESTRICTION: 0
OD_SUPERIOR_NASAL_RESTRICTION: 0
OS_NORMAL: 1
OS_INFERIOR_NASAL_RESTRICTION: 0
OD_NORMAL: 1
OD_INFERIOR_TEMPORAL_RESTRICTION: 0

## 2024-10-25 ASSESSMENT — CUP TO DISC RATIO
OD_RATIO: .65
OS_RATIO: 0.4

## 2024-10-25 ASSESSMENT — VISUAL ACUITY
OD_CC: 20/20
CORRECTION_TYPE: GLASSES
METHOD: SNELLEN - LINEAR
OS_CC: 20/20

## 2024-10-25 ASSESSMENT — TONOMETRY
IOP_METHOD: GOLDMANN APPLANATION
OS_IOP_MMHG: 16
OD_IOP_MMHG: 14

## 2024-10-25 ASSESSMENT — EXTERNAL EXAM - LEFT EYE: OS_EXAM: NORMAL

## 2024-10-25 ASSESSMENT — PACHYMETRY
OS_CT(UM): 529
OD_CT(UM): 554

## 2024-10-25 ASSESSMENT — SLIT LAMP EXAM - LIDS
COMMENTS: GOOD POSITION
COMMENTS: GOOD POSITION

## 2024-10-25 NOTE — PROGRESS NOTES
Assessment/Plan   Problem List Items Addressed This Visit          Eye/Vision problems    Pseudophakia     Posterior chamber intraocular lens (PCIOL) in both eyes - clear right eye, left eye with trace temporal PCO.  Pt educated on findings. No treatment indicated at this time. Monitor only. Pt voiced understanding.         Vitreous degeneration of both eyes - Primary     Pt report floaters in left eye started 3-4 days ago. DFE today shows vitreous degeneration in both eyes today. No retinal breaks.  Discussed signs and symptoms of retinal hole, tear, detachment. Patient educated that retinal detachment can lead to permanent vision loss. Patient consents to return if they notice new floaters, flashes, curtain or veil covering vision. Pt has appt in January in Dr. Barrow - will follow up with DFE at that time. Pt voiced understanding.

## 2024-10-25 NOTE — ASSESSMENT & PLAN NOTE
Posterior chamber intraocular lens (PCIOL) in both eyes - clear right eye, left eye with trace temporal PCO.  Pt educated on findings. No treatment indicated at this time. Monitor only. Pt voiced understanding.

## 2024-10-25 NOTE — ASSESSMENT & PLAN NOTE
Pt report floaters in left eye started 3-4 days ago. DFE today shows vitreous degeneration in both eyes today. No retinal breaks.  Discussed signs and symptoms of retinal hole, tear, detachment. Patient educated that retinal detachment can lead to permanent vision loss. Patient consents to return if they notice new floaters, flashes, curtain or veil covering vision. Pt has appt in January in Dr. Barrow - will follow up with DFE at that time. Pt voiced understanding.

## 2024-12-12 ENCOUNTER — OFFICE VISIT (OUTPATIENT)
Dept: CARDIOLOGY | Facility: CLINIC | Age: 69
End: 2024-12-12
Payer: COMMERCIAL

## 2024-12-12 VITALS
RESPIRATION RATE: 20 BRPM | WEIGHT: 171 LBS | SYSTOLIC BLOOD PRESSURE: 124 MMHG | BODY MASS INDEX: 26.84 KG/M2 | HEART RATE: 66 BPM | DIASTOLIC BLOOD PRESSURE: 77 MMHG | OXYGEN SATURATION: 97 % | HEIGHT: 67 IN

## 2024-12-12 DIAGNOSIS — I50.32 CHRONIC DIASTOLIC CONGESTIVE HEART FAILURE: ICD-10-CM

## 2024-12-12 DIAGNOSIS — I10 WELL-CONTROLLED HYPERTENSION: Primary | ICD-10-CM

## 2024-12-12 PROCEDURE — 3008F BODY MASS INDEX DOCD: CPT

## 2024-12-12 PROCEDURE — 99212 OFFICE O/P EST SF 10 MIN: CPT

## 2024-12-12 PROCEDURE — 1159F MED LIST DOCD IN RCRD: CPT

## 2024-12-12 PROCEDURE — 3078F DIAST BP <80 MM HG: CPT

## 2024-12-12 PROCEDURE — 1036F TOBACCO NON-USER: CPT

## 2024-12-12 PROCEDURE — 3074F SYST BP LT 130 MM HG: CPT

## 2024-12-12 ASSESSMENT — ENCOUNTER SYMPTOMS
LOSS OF SENSATION IN FEET: 0
DEPRESSION: 0
OCCASIONAL FEELINGS OF UNSTEADINESS: 0

## 2024-12-12 ASSESSMENT — PATIENT HEALTH QUESTIONNAIRE - PHQ9
1. LITTLE INTEREST OR PLEASURE IN DOING THINGS: NOT AT ALL
SUM OF ALL RESPONSES TO PHQ9 QUESTIONS 1 AND 2: 0
2. FEELING DOWN, DEPRESSED OR HOPELESS: NOT AT ALL

## 2024-12-12 NOTE — PROGRESS NOTES
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     Reports that she has been feeling fairly well. She has not been exercising .  Patient denies chest pain and angina.  Pt denies shortness of breath, dyspnea on exertion, orthopnea, and paroxysmal nocturnal dyspnea.  Pt denies worsening lower extremity edema.  Pt denies palpitations or syncope.  No recent falls.  No fever or chills.  No cough.  No change in bowel or bladder habits.  No sick contacts.  No recent travel.    Review of Systems   All other systems reviewed and are negative.    Last Recorded Vitals:  Vitals:    12/12/24 1000   BP: 124/77   Pulse: 66   Resp: 20   SpO2: 97%       Past Medical History:  She has a past medical history of Cataract, Dry eyes, Encounter for general adult medical examination without abnormal findings (09/18/2021), Glaucoma suspect of both eyes, Malignant neoplasm of unspecified fallopian tube (Multi) (03/25/2015), Nevus of Blayne, Personal history of other benign neoplasm, Personal history of other diseases of urinary system, PVD (posterior vitreous detachment), right eye, and Retinal drusen of both eyes.    Past Surgical History:  She has a past surgical history that includes Exploratory laparotomy (12/31/2015); Cholecystectomy (12/31/2015); Small intestine surgery (06/03/2019); Total abdominal hysterectomy w/ bilateral salpingoophorectomy (06/03/2019); Other surgical history (03/23/2017); US guided abdominal paracentesis (05/23/2013); US guided abscess drain (05/28/2013); and Cataract extraction.      Social History:  She reports that she has never smoked. She has never used smokeless tobacco. Alcohol use questions deferred to the physician. Drug use questions deferred to the physician.    Family History:  Family History   Problem Relation Name Age of Onset    Bone cancer Sister       Colon cancer Brother      Lymphoma Brother      Prostate cancer Brother      Bone cancer Brother          Allergies:  Patient has no known allergies.    Outpatient Medications:  Current Outpatient Medications   Medication Instructions    amLODIPine (NORVASC) 10 mg, oral, Daily    cholecalciferol (VITAMIN D-3) 2,000 Units, Daily    cloNIDine (Catapres-TTS) 0.3 mg/24 hr patch 1 patch, Once Weekly    diphenoxylate-atropine (LomotiL) 2.5-0.025 mg tablet 1 tablet, oral, 2 times daily    estradiol (VAGIFEM) 10 mcg, vaginal, 2 times weekly    hydrocortisone 2.5 % cream 1 Application, As needed    lisinopril 40 mg, oral, Daily    metoprolol succinate XL (TOPROL-XL) 100 mg, Daily    naproxen (NAPROSYN) 500 mg, Every 12 hours    predniSONE (DELTASONE) 10 mg, Daily    tretinoin (Retin-A) 0.025 % cream 1 Application, Nightly       Physical Exam  Constitutional:       Appearance: Normal appearance.   Neck:      Vascular: No carotid bruit.   Cardiovascular:      Rate and Rhythm: Normal rate and regular rhythm.      Heart sounds: No murmur heard.  Pulmonary:      Effort: Pulmonary effort is normal.      Breath sounds: Normal breath sounds.   Abdominal:      Palpations: Abdomen is soft.   Skin:     General: Skin is warm.   Neurological:      Mental Status: She is alert and oriented to person, place, and time.   Psychiatric:         Mood and Affect: Mood normal.       Last Labs:  CBC -  Lab Results   Component Value Date    WBC 3.8 (L) 04/17/2024    HGB 12.4 04/17/2024    HCT 38.4 04/17/2024    MCV 93 04/17/2024     04/17/2024       CMP -  Lab Results   Component Value Date    CALCIUM 9.1 04/17/2024    PHOS 3.4 10/12/2020    PROT 6.6 04/17/2024    ALBUMIN 4.0 04/17/2024    AST 18 04/17/2024    ALT 13 04/17/2024    ALKPHOS 88 04/17/2024    BILITOT 1.1 04/17/2024       LIPID PANEL -   Lab Results   Component Value Date    CHOL 152 04/17/2024    TRIG 105 04/17/2024    HDL 81.6 04/17/2024    CHHDL 1.9 04/17/2024    LDLF 66  08/25/2023    VLDL 21 04/17/2024    NHDL 70 04/17/2024       RENAL FUNCTION PANEL -   Lab Results   Component Value Date    GLUCOSE 119 (H) 04/17/2024     04/17/2024    K 3.5 04/17/2024     04/17/2024    CO2 22 04/17/2024    ANIONGAP 15 04/17/2024    BUN 17 04/17/2024    CREATININE 1.02 04/17/2024    CALCIUM 9.1 04/17/2024    PHOS 3.4 10/12/2020    ALBUMIN 4.0 04/17/2024        Lab Results   Component Value Date    HGBA1C 5.2 04/22/2022       Last Cardiology Tests:  ECG:    Assessment/Plan   Dian Gilliam is a 67 y/o with a PMH of HFpEF 70% on echocardiogram 3/9/22, HTN, serous carcinoma of the fallopian tube( post op bowel obstruction).     Reports that she has been feeling fairly well. She has not been exercising . She denies cardiac complaints. LDL at goal . She is normotensive and appears euvolemic.     - Increase Activity at least 150 minutes per week     - Follow up in 1 year       PEDRO Mattson-CNP

## 2024-12-12 NOTE — PATIENT INSTRUCTIONS
Miss,    Restart Exercise Program 150 minutes per week ( 30 minutes a day 5 days a week)    No medication changes    Follow up in 1 year or sooner if you have cardiac symptoms    Ailyn VASQUEZ-CNP

## 2024-12-29 ASSESSMENT — CUP TO DISC RATIO
OD_RATIO: .65
OS_RATIO: 0.4

## 2024-12-29 ASSESSMENT — SLIT LAMP EXAM - LIDS
COMMENTS: GOOD POSITION
COMMENTS: GOOD POSITION

## 2024-12-29 ASSESSMENT — EXTERNAL EXAM - LEFT EYE: OS_EXAM: NORMAL

## 2024-12-29 NOTE — PROGRESS NOTES
Glaucoma suspect of both eyesH40.003  Nevus of OtaD22.30  -No known FH of glaucoma.  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 Will monitor.  -Pachymetry (5/20/19) - 554/529  -HVF 24-2 (1/6/25) - OD: 37% FP. Central depression. OS: OS: 26%FP. Central scattered depression. Unreliable OU due to excessive false positives. Difficult to compare to prior due to high false positives.   -OCT RNFL (1/6/25) - SS: 5/10 OD and 6/10 OS. WNL OU. 93/84. Stable from 8/30/20.   -Gonioscopy (1/6/25) - Open to TM OU, pigment OD>OS. Stable from 8/30/21.   -Will plan to defer future HVF 24-2 due to decreased reliability.   -Plan: Exam and testing stable. Low suspicion for glaucoma at this time. No treatment necessary. F/u 1 year with comprehensive exam, repeat OCT RNFL OU.    Posterior vitreous detachment, bilateral  -Seen by Dr. Garcia 10/25/24 for new floater OS.  -No retinal tear or detachment seen on exam. Retinal detachment symptoms discussed.     Retinal akkpthG42.369  -OCT macula (12/18/23) - SS: 8/10 OD and 7/10 OS. Normal thickness and contour OU. Intact IS-OS OU. No edema OU. 260/269. Stable from 12/2/22.   -Not visually significant. No elevation/SRF.  -Monitor with annual dilated exam.     Dry eyes, npzeurhtkO96.123  -Occasional irritation and itching. No history of seasonal allergies. Advised to use artificial tears (Refresh) PRN. Add warm compresses PRN.     Pseudophakia of right eyeZ96.1 - 1/17/19 - Complex with Trypan Blue and Malyugin Ring  PCO (posterior capsular opacification), leftH26.492  Pseudophakia of left eyeZ96.1 - 12/13/18 - Complex with Trypan Blue  -Doing well. Good vision. IOP good.  -Trace PCO OS - stable, not visually significant. Monitor, can consider YAG laser capsulotomy in the future as needed.     VanmdyS31.10  YwdnfpbjfpyJ94.209  DsphtggzbdA05.4  -New Rx for glasses given per  patient request. Patient's signature obtained to acknowledge and confirm that a paper copy of glasses Rx was given to patient in compliance with [facility] Eyeglass Rule. Electronic copy of Rx will also be available via [employer].

## 2025-01-03 ENCOUNTER — LAB (OUTPATIENT)
Dept: LAB | Facility: LAB | Age: 70
End: 2025-01-03
Payer: COMMERCIAL

## 2025-01-03 DIAGNOSIS — E78.49 OTHER HYPERLIPIDEMIA: ICD-10-CM

## 2025-01-03 DIAGNOSIS — I10 ESSENTIAL (PRIMARY) HYPERTENSION: Primary | ICD-10-CM

## 2025-01-03 DIAGNOSIS — R73.9 HYPERGLYCEMIA, UNSPECIFIED: ICD-10-CM

## 2025-01-03 LAB
ANION GAP SERPL CALC-SCNC: 13 MMOL/L (ref 10–20)
BASOPHILS # BLD AUTO: 0.03 X10*3/UL (ref 0–0.1)
BASOPHILS NFR BLD AUTO: 0.9 %
BUN SERPL-MCNC: 19 MG/DL (ref 6–23)
CALCIUM SERPL-MCNC: 9.4 MG/DL (ref 8.6–10.3)
CHLORIDE SERPL-SCNC: 107 MMOL/L (ref 98–107)
CHOLEST SERPL-MCNC: 188 MG/DL (ref 0–199)
CHOLESTEROL/HDL RATIO: 2.1
CO2 SERPL-SCNC: 24 MMOL/L (ref 21–32)
CREAT SERPL-MCNC: 1.03 MG/DL (ref 0.5–1.05)
EGFRCR SERPLBLD CKD-EPI 2021: 60 ML/MIN/1.73M*2
EOSINOPHIL # BLD AUTO: 0.04 X10*3/UL (ref 0–0.7)
EOSINOPHIL NFR BLD AUTO: 1.2 %
ERYTHROCYTE [DISTWIDTH] IN BLOOD BY AUTOMATED COUNT: 13.8 % (ref 11.5–14.5)
EST. AVERAGE GLUCOSE BLD GHB EST-MCNC: 85 MG/DL
GLUCOSE SERPL-MCNC: 107 MG/DL (ref 74–99)
HBA1C MFR BLD: 4.6 %
HCT VFR BLD AUTO: 37.9 % (ref 36–46)
HDLC SERPL-MCNC: 90.3 MG/DL
HGB BLD-MCNC: 12.5 G/DL (ref 12–16)
IMM GRANULOCYTES # BLD AUTO: 0 X10*3/UL (ref 0–0.7)
IMM GRANULOCYTES NFR BLD AUTO: 0 % (ref 0–0.9)
LDLC SERPL CALC-MCNC: 74 MG/DL
LYMPHOCYTES # BLD AUTO: 1.73 X10*3/UL (ref 1.2–4.8)
LYMPHOCYTES NFR BLD AUTO: 52.1 %
MCH RBC QN AUTO: 30.9 PG (ref 26–34)
MCHC RBC AUTO-ENTMCNC: 33 G/DL (ref 32–36)
MCV RBC AUTO: 94 FL (ref 80–100)
MONOCYTES # BLD AUTO: 0.37 X10*3/UL (ref 0.1–1)
MONOCYTES NFR BLD AUTO: 11.1 %
NEUTROPHILS # BLD AUTO: 1.15 X10*3/UL (ref 1.2–7.7)
NEUTROPHILS NFR BLD AUTO: 34.7 %
NON HDL CHOLESTEROL: 98 MG/DL (ref 0–149)
NRBC BLD-RTO: 0 /100 WBCS (ref 0–0)
PLATELET # BLD AUTO: 180 X10*3/UL (ref 150–450)
POTASSIUM SERPL-SCNC: 4.1 MMOL/L (ref 3.5–5.3)
RBC # BLD AUTO: 4.04 X10*6/UL (ref 4–5.2)
SODIUM SERPL-SCNC: 140 MMOL/L (ref 136–145)
TRIGL SERPL-MCNC: 117 MG/DL (ref 0–149)
TSH SERPL-ACNC: 0.56 MIU/L (ref 0.44–3.98)
VLDL: 23 MG/DL (ref 0–40)
WBC # BLD AUTO: 3.3 X10*3/UL (ref 4.4–11.3)

## 2025-01-03 PROCEDURE — 80048 BASIC METABOLIC PNL TOTAL CA: CPT

## 2025-01-03 PROCEDURE — 80061 LIPID PANEL: CPT

## 2025-01-03 PROCEDURE — 84443 ASSAY THYROID STIM HORMONE: CPT

## 2025-01-03 PROCEDURE — 83036 HEMOGLOBIN GLYCOSYLATED A1C: CPT

## 2025-01-03 PROCEDURE — 85025 COMPLETE CBC W/AUTO DIFF WBC: CPT

## 2025-01-06 ENCOUNTER — APPOINTMENT (OUTPATIENT)
Dept: OPHTHALMOLOGY | Facility: CLINIC | Age: 70
End: 2025-01-06
Payer: COMMERCIAL

## 2025-01-06 DIAGNOSIS — H52.4 PRESBYOPIA: ICD-10-CM

## 2025-01-06 DIAGNOSIS — D22.30 NEVUS OF OTA: ICD-10-CM

## 2025-01-06 DIAGNOSIS — H52.13 MYOPIA OF BOTH EYES: ICD-10-CM

## 2025-01-06 DIAGNOSIS — H43.813 PVD (POSTERIOR VITREOUS DETACHMENT), BOTH EYES: ICD-10-CM

## 2025-01-06 DIAGNOSIS — Z96.1 PSEUDOPHAKIA: ICD-10-CM

## 2025-01-06 DIAGNOSIS — H04.123 DRY EYES, BILATERAL: ICD-10-CM

## 2025-01-06 DIAGNOSIS — H26.492 PCO (POSTERIOR CAPSULAR OPACIFICATION), LEFT: ICD-10-CM

## 2025-01-06 DIAGNOSIS — H35.363 RETINAL DRUSEN OF BOTH EYES: ICD-10-CM

## 2025-01-06 DIAGNOSIS — H40.003 GLAUCOMA SUSPECT OF BOTH EYES: Primary | ICD-10-CM

## 2025-01-06 DIAGNOSIS — H52.203 ASTIGMATISM OF BOTH EYES, UNSPECIFIED TYPE: ICD-10-CM

## 2025-01-06 PROBLEM — H43.392 VITREOUS FLOATER, LEFT: Status: ACTIVE | Noted: 2025-01-06

## 2025-01-06 PROCEDURE — 92133 CPTRZD OPH DX IMG PST SGM ON: CPT | Performed by: OPHTHALMOLOGY

## 2025-01-06 PROCEDURE — 92083 EXTENDED VISUAL FIELD XM: CPT | Performed by: OPHTHALMOLOGY

## 2025-01-06 PROCEDURE — 92015 DETERMINE REFRACTIVE STATE: CPT | Performed by: OPHTHALMOLOGY

## 2025-01-06 PROCEDURE — 92020 GONIOSCOPY: CPT | Performed by: OPHTHALMOLOGY

## 2025-01-06 PROCEDURE — 99214 OFFICE O/P EST MOD 30 MIN: CPT | Performed by: OPHTHALMOLOGY

## 2025-01-06 ASSESSMENT — PACHYMETRY
OS_CT(UM): 529
OD_CT(UM): 554

## 2025-01-06 ASSESSMENT — GONIOSCOPY
OS_SUPERIOR: D40F, TR PTM, NO PAS/NVA
METHOD: SUSSMAN, FOUR MIRROR
OS_NASAL: D40F, TR PTM, NO PAS/NVA
OS_INFERIOR: D40F, TR PTM, NO PAS/NVA
OS_TEMPORAL: D40F, TR PTM, NO PAS/NVA

## 2025-01-06 ASSESSMENT — REFRACTION_MANIFEST
OD_SPHERE: -1.25
OS_CYLINDER: -0.50
OD_CYLINDER: -0.75
OS_AXIS: 085
OS_SPHERE: -2.25
OD_AXIS: 132
OD_ADD: +2.50
OS_ADD: +2.50

## 2025-01-06 ASSESSMENT — REFRACTION_WEARINGRX
OS_ADD: +2.50
OD_AXIS: 135
OD_CYLINDER: -0.50
OD_ADD: +2.50
OS_CYLINDER: -0.50
OD_SPHERE: -1.75
OS_SPHERE: -2.00
OS_AXIS: 085

## 2025-01-06 ASSESSMENT — CONF VISUAL FIELD
OS_INFERIOR_TEMPORAL_RESTRICTION: 0
OS_NORMAL: 1
OS_INFERIOR_NASAL_RESTRICTION: 0
OD_NORMAL: 1
OD_SUPERIOR_NASAL_RESTRICTION: 0
OD_SUPERIOR_TEMPORAL_RESTRICTION: 0
OD_INFERIOR_NASAL_RESTRICTION: 0
OS_SUPERIOR_NASAL_RESTRICTION: 0
OS_SUPERIOR_TEMPORAL_RESTRICTION: 0
OD_INFERIOR_TEMPORAL_RESTRICTION: 0

## 2025-01-06 ASSESSMENT — VISUAL ACUITY
OD_CC: J1+
OS_CC: J1+
CORRECTION_TYPE: GLASSES
OS_CC: 20/20
METHOD: SNELLEN - LINEAR
OD_CC: 20/20

## 2025-01-06 ASSESSMENT — TONOMETRY
IOP_METHOD: GOLDMANN APPLANATION
OS_IOP_MMHG: 14
OD_IOP_MMHG: 14

## 2025-01-06 ASSESSMENT — ENCOUNTER SYMPTOMS: EYES NEGATIVE: 1

## 2025-01-09 ENCOUNTER — TELEMEDICINE (OUTPATIENT)
Dept: OBSTETRICS AND GYNECOLOGY | Facility: CLINIC | Age: 70
End: 2025-01-09
Payer: COMMERCIAL

## 2025-01-09 DIAGNOSIS — R15.9 FULL INCONTINENCE OF FECES: Primary | ICD-10-CM

## 2025-01-09 DIAGNOSIS — R19.7 DIARRHEA, UNSPECIFIED TYPE: ICD-10-CM

## 2025-01-09 PROCEDURE — 99212 OFFICE O/P EST SF 10 MIN: CPT | Performed by: OBSTETRICS & GYNECOLOGY

## 2025-01-09 RX ORDER — DIPHENOXYLATE HYDROCHLORIDE AND ATROPINE SULFATE 2.5; .025 MG/1; MG/1
1 TABLET ORAL 2 TIMES DAILY
Qty: 60 TABLET | Refills: 2 | Status: SHIPPED | OUTPATIENT
Start: 2025-01-09 | End: 2025-02-08

## 2025-01-09 ASSESSMENT — ENCOUNTER SYMPTOMS
EYES NEGATIVE: 1
CONSTITUTIONAL NEGATIVE: 1
CARDIOVASCULAR NEGATIVE: 1
NEUROLOGICAL NEGATIVE: 1
DIARRHEA: 1
RESPIRATORY NEGATIVE: 1
MUSCULOSKELETAL NEGATIVE: 1
ENDOCRINE NEGATIVE: 1
PSYCHIATRIC NEGATIVE: 1

## 2025-01-09 NOTE — PROGRESS NOTES
Urogynecology  Provider:  Ronda Butts MD  [phone]    ASSESSMENT AND PLAN:   69 year old female with chronic diarrhea, FI, and vaginal atrophy. Comorbidities include: Diastolic CHF, HTN, stage 3 CKD, and hx of fallopian tube carcinoma.     Diagnoses:  #1 Chronic diarrhea  #2 Fecal incontinence     Plan:  1. FI, chronic diarrhea  - Continue taking Lomotil 2x/day as prescribed as this is reducing her FI with diarrhea without any adverse side effects such as constipation.   > Sent Rx one month supply with 2 refills of Lomotil #30 days supply to her pharmacy.     Follow up in 3 months with Dr. Butts for a virtual visit.     Phone call visit today: The patient's identity was confirmed and that she is located in [place].   Ronda Butts MD identified herself and the patient consented to a telehealth visit today. Telephone visit time: 5 minutes    Scribe Attestation  By signing my name below, I, Gavino Jones, Scribpatricia, attest that this documentation has been prepared under the direction and in the presence of Ronda Butts MD on 01/09/2025 at 8:13 PM.     Agree with above. I Dr. Butts, personally performed the services described in the documentation which was scribed virtually and confirm it is both complete and accurate.  Ronda Butts MD        Problem List Items Addressed This Visit          Gastrointestinal and Abdominal    Diarrhea    Relevant Medications    diphenoxylate-atropine (LomotiL) 2.5-0.025 mg tablet     Other Visit Diagnoses       Full incontinence of feces    -  Primary    Relevant Medications    diphenoxylate-atropine (LomotiL) 2.5-0.025 mg tablet               I spent a total of 10 minutes in face to face and non face to face time at this virtual visit.          Ronda Butts MD        HISTORY OF PRESENT ILLNESS:   69 year old female presenting in virtual visit follow up for chronic diarrhea.     Records Review:  - Last visit 10/2024  ASSESSMENT AND PLAN:   68 y.o. female  being assessed for chronic diarrhea and vaginal atrophy.      Diagnoses:   #1 Chronic diarrhea     #2 Vaginal atrophy     Plan:   1. Chronic diarrhea  - She was given a rx for 3 month supply of Lomotil.    - Continue to follow up q3 months as this rx is a controlled substance.      2. Vaginal atrophy   - Continue Vagifem use; insert 1 tablet twice per week.     Bowel Symptoms:   - The patient reports taking Lomotil and this reducing her FI with diarrhea.   - She uses Lomotil 2x/day with adequate symptom relief.  - Denies any constipation or significant issues since starting Lomotil.       Past Medical History:     Past Medical History:   Diagnosis Date    Cataract     Dry eyes     Encounter for general adult medical examination without abnormal findings 09/18/2021    Encounter for preventive health examination    Glaucoma suspect of both eyes     Malignant neoplasm of unspecified fallopian tube (Multi) 03/25/2015    Fallopian tube carcinoma    Nevus of Blayne     Personal history of other benign neoplasm     History of uterine leiomyoma    Personal history of other diseases of urinary system     History of renal failure    PVD (posterior vitreous detachment), right eye     Retinal drusen of both eyes         Past Surgical History:     Past Surgical History:   Procedure Laterality Date    CATARACT EXTRACTION      CHOLECYSTECTOMY  12/31/2015    Cholecystectomy Laparoscopic    EXPLORATORY LAPAROTOMY  12/31/2015    Exploratory Laparotomy    OTHER SURGICAL HISTORY  03/23/2017    Breast Reconstruction With Tissue Expander Bilateral    SMALL INTESTINE SURGERY  06/03/2019    Small Bowel Resection    TOTAL ABDOMINAL HYSTERECTOMY W/ BILATERAL SALPINGOOPHORECTOMY  06/03/2019    Total Abdominal Hysterectomy With Removal Of Both Ovaries    US GUIDED ABDOMINAL PARACENTESIS  05/23/2013    US GUIDED ABDOMINAL PARACENTESIS 5/23/2013 [facility] CLINICAL LEGACY    US GUIDED ABSCESS DRAIN  05/28/2013    US GUIDED ABSCESS DRAIN 5/28/2013 [facility]  CLINICAL LEGACY       Medications:     Prior to Admission medications    Medication Sig Start Date End Date Taking? Authorizing Provider   amLODIPine (Norvasc) 10 mg tablet TAKE ONE TABLET BY MOUTH EVERY DAY 12/11/23   CHRISTIAN Mattson   cholecalciferol (Vitamin D-3) 50 MCG (2000 UT) tablet Take 1 tablet (2,000 Units) by mouth once daily. 4/16/19   Historical Provider, MD   diphenoxylate-atropine (LomotiL) 2.5-0.025 mg tablet Take 1 tablet by mouth 2 times a day. 10/21/24 11/20/24  Ronda Butts MD   estradiol (Vagifem) 10 mcg tablet vaginal tablet Insert 1 tablet (10 mcg) into the vagina 2 times a week. 9/16/24   Ronda Butts MD   hydrocortisone 2.5 % cream Apply 1 Application topically if needed. 8/31/23   Historical Provider, MD   lisinopril 40 mg tablet TAKE ONE TABLET BY MOUTH EVERY DAY 10/6/23   CHRISTIAN Mattson   metoprolol succinate XL (Toprol-XL) 100 mg 24 hr tablet Take 1 tablet (100 mg) by mouth once daily. 12/17/14   Historical Provider, MD   naproxen (Naprosyn) 500 mg tablet Take 1 tablet (500 mg) by mouth every 12 hours.  Patient not taking: Reported on 12/12/2024 6/2/22   Historical Provider, MD   predniSONE (Deltasone) 10 mg tablet Take 1 tablet (10 mg) by mouth once daily.  Patient not taking: Reported on 12/12/2024 3/15/23   Historical Provider, MD   tretinoin (Retin-A) 0.025 % cream Apply 1 Application topically once daily at bedtime.  Patient not taking: Reported on 12/12/2024 2/9/22   Historical Provider, MD RAVI  Review of Systems   Constitutional: Negative.    HENT: Negative.     Eyes: Negative.    Respiratory: Negative.     Cardiovascular: Negative.    Gastrointestinal:  Positive for diarrhea.   Endocrine: Negative.    Genitourinary: Negative.    Musculoskeletal: Negative.    Neurological: Negative.    Psychiatric/Behavioral: Negative.            Data and DIAGNOSTIC STUDIES REVIEWED   Savage Visual Field - OU - Both Eyes    Result Date:  "1/6/2025  -HVF 24-2 (1/6/25) - OD: 37% FP. Central depression. OS: OS: 26%FP. Central scattered depression. Unreliable OU due to excessive false positives. Difficult to compare to prior due to high false positives.     OCT, Optic Nerve - OU - Both Eyes    Result Date: 1/6/2025  -OCT RNFL (1/6/25) - SS: 5/10 OD and 6/10 OS. WNL OU. 93/84. Stable from 8/30/20.      No results found for: \"URINECULTURE\", \"UAMICCOMM\"   Lab Results   Component Value Date    GLUCOSE 107 (H) 01/03/2025    CALCIUM 9.4 01/03/2025     01/03/2025    K 4.1 01/03/2025    CO2 24 01/03/2025     01/03/2025    BUN 19 01/03/2025    CREATININE 1.03 01/03/2025     Lab Results   Component Value Date    WBC 3.3 (L) 01/03/2025    HGB 12.5 01/03/2025    HCT 37.9 01/03/2025    MCV 94 01/03/2025     01/03/2025        "

## 2025-01-15 ENCOUNTER — HOSPITAL ENCOUNTER (OUTPATIENT)
Dept: NEUROLOGY | Facility: CLINIC | Age: 70
Discharge: HOME | End: 2025-01-15
Payer: COMMERCIAL

## 2025-01-15 DIAGNOSIS — M79.642 PAIN IN LEFT HAND: ICD-10-CM

## 2025-01-15 PROCEDURE — 95886 MUSC TEST DONE W/N TEST COMP: CPT | Performed by: PSYCHIATRY & NEUROLOGY

## 2025-01-15 PROCEDURE — 95910 NRV CNDJ TEST 7-8 STUDIES: CPT | Performed by: PSYCHIATRY & NEUROLOGY

## 2025-01-15 NOTE — PROCEDURES
EMG & nerve conduction    Date/Time: 1/15/2025 11:58 AM    Performed by: Brian Johnson MD  Authorized by: Irwin Camara MD    Consent:     Consent obtained:  Written    Consent given by:  Patient  Universal protocol:     Procedure explained and questions answered to patient or proxy's satisfaction: yes      Patient identity confirmed:  Verbally with patient and provided demographic data  Indications:     Indications:  Left shoulder pain and arm paresthesia, evaluate for median neuropathy at the wrist and cervical radiculopathy.  Sedation:     Sedation type:  None  Anesthesia:     Anesthesia method:  None  Procedure specific details:      This is a normal study.    There is no electrophysiologic evidence of left median neuropathy at the wrist.    There is no electrophysiologic evidence of left cervical radiculopathy.    Note that given the recent onset of symptoms (approximately 2 weeks ago), this study has reduced sensitivity for detection of cervical radiculopathy.  Clinical correlation is recommended, and if symptoms persist or worsen in the future, consideration may be given to repeat electrodiagnostic testing, at the discretion of the referring provider.    Brian Johnson MD    Post-procedure details:     Procedure completion:  Tolerated well, no immediate complications

## 2025-01-27 ENCOUNTER — APPOINTMENT (OUTPATIENT)
Dept: OBSTETRICS AND GYNECOLOGY | Facility: CLINIC | Age: 70
End: 2025-01-27
Payer: COMMERCIAL

## 2025-01-28 DIAGNOSIS — Z12.11 COLON CANCER SCREENING: ICD-10-CM

## 2025-01-28 RX ORDER — SODIUM, POTASSIUM,MAG SULFATES 17.5-3.13G
SOLUTION, RECONSTITUTED, ORAL ORAL
Qty: 1 EACH | Refills: 0 | Status: SHIPPED | OUTPATIENT
Start: 2025-01-28

## 2025-02-11 DIAGNOSIS — R15.9 FULL INCONTINENCE OF FECES: ICD-10-CM

## 2025-02-11 DIAGNOSIS — R19.7 DIARRHEA, UNSPECIFIED TYPE: ICD-10-CM

## 2025-02-19 RX ORDER — DIPHENOXYLATE HYDROCHLORIDE AND ATROPINE SULFATE 2.5; .025 MG/1; MG/1
1 TABLET ORAL 2 TIMES DAILY
Qty: 60 TABLET | Refills: 1 | Status: SHIPPED | OUTPATIENT
Start: 2025-02-19

## 2025-02-19 NOTE — TELEPHONE ENCOUNTER
Request received for   Requested Prescriptions     Pending Prescriptions Disp Refills    diphenoxylate-atropine (Lomotil) 2.5-0.025 mg tablet [Pharmacy Med Name: DIPHENOXYLATE-ATROPI 2.5-0.025 TABS] 60 tablet 1     Sig: TAKE ONE TABLET BY MOUTH TWICE A DAY       Dian Gilliam was last seen 1/9/2025 and has an appt for 4/10/2025.     Contract current, no drug screen on file

## 2025-03-06 ENCOUNTER — APPOINTMENT (OUTPATIENT)
Dept: GASTROENTEROLOGY | Facility: EXTERNAL LOCATION | Age: 70
End: 2025-03-06
Payer: COMMERCIAL

## 2025-03-06 DIAGNOSIS — Z80.0 FAMILY HISTORY OF MALIGNANT NEOPLASM OF DIGESTIVE ORGANS: ICD-10-CM

## 2025-03-06 DIAGNOSIS — Z12.11 COLON CANCER SCREENING: Primary | ICD-10-CM

## 2025-03-06 PROCEDURE — G0105 COLORECTAL SCRN; HI RISK IND: HCPCS | Performed by: INTERNAL MEDICINE

## 2025-04-10 ENCOUNTER — TELEMEDICINE (OUTPATIENT)
Dept: OBSTETRICS AND GYNECOLOGY | Facility: CLINIC | Age: 70
End: 2025-04-10
Payer: COMMERCIAL

## 2025-04-10 DIAGNOSIS — R15.9 FULL INCONTINENCE OF FECES: ICD-10-CM

## 2025-04-10 DIAGNOSIS — N95.2 VAGINAL ATROPHY: ICD-10-CM

## 2025-04-10 DIAGNOSIS — R19.7 DIARRHEA, UNSPECIFIED TYPE: ICD-10-CM

## 2025-04-10 RX ORDER — ESTRADIOL 10 UG/1
10 TABLET, FILM COATED VAGINAL 2 TIMES WEEKLY
Qty: 24 TABLET | Refills: 3 | Status: SHIPPED | OUTPATIENT
Start: 2025-04-10

## 2025-04-10 RX ORDER — DIPHENOXYLATE HYDROCHLORIDE AND ATROPINE SULFATE 2.5; .025 MG/1; MG/1
1 TABLET ORAL 2 TIMES DAILY
Qty: 60 TABLET | Refills: 2 | Status: SHIPPED | OUTPATIENT
Start: 2025-04-18

## 2025-04-10 NOTE — PROGRESS NOTES
Urogynecology  Provider:  Ronda Butts MD  [phone]              ASSESSMENT AND PLAN:   69-year-old female being assessed for FI, chronic diarrhea, and vaginal atrophy.    Diagnoses:  #1 Fecal incontinence  #2 Chronic diarrhea  #3 Vaginal atrophy    Plan:  FI, chronic diarrhea  - Due for Lomotil refill on 4/18/2025.  > Script placed with 4/18/25 fill date.    2. Vaginal atrophy  - Vagifem refilled.  - She experienced issues with insurance only covering a 30-day supply instead of 90 days.  - Will send a prescription to [facility] for a 90-day supply with 2 refills of Vagifem.    Vagifem refilled. Due for lomotil refill on 4/18/25. Script placed fwith 4/18/25 fill date. I have personally reviewed the OARRS report for. This report is scanned into the electronic medical record. I have considered the risks of abuse, dependence, addiction and diversion.     Follow-up with Dr. Butts in 3 months.    Scribe Attestation  By signing my name below, I, Cher Persaud, attest that this documentation has been prepared under the direction and in the presence of Ronda Butts MD on 04/10/2025 at 11:51 AM.    Agree with above. I Dr. Butts, personally performed the services described in the documentation which was scribed virtually and confirm it is both complete and accurate.  Ronda Butts MD        Problem List Items Addressed This Visit    None          I spent a total of 11 minutes in face to face and non face to face time at this virtual visit.          Ronda Butts MD    Virtual or Telephone Consent    While technically available, the patient was unable or unwilling to consent to connect via audio/video telehealth technology; therefore, I performed this visit using a real-time audio only connection between Dian Gilliam & Ronda Butts MD.  Verbal consent was requested and obtained from Dian Gilliam on this date, 04/10/25 for a telehealth visit and the patient's location was confirmed  at the time of the visit.      HISTORY OF PRESENT ILLNESS:     Last visit 1/2025   Diagnoses:  #1 Chronic diarrhea  #2 Fecal incontinence      Plan:  1. FI, chronic diarrhea  - Continue taking Lomotil 2x/day as prescribed as this is reducing her FI with diarrhea without any adverse side effects such as constipation.   > Sent Rx one month supply with 2 refills of Lomotil #30 days supply to her pharmacy.      Follow up in 3 months with Dr. Butts for a virtual visit.         Bowel Symptoms:   - She reports the Lomotil is working well without any problems.  - She mentions needing refills for medications, as the insurance only covered a 30-day supply instead of 90 days.  - She is taking a vaginal tablet, but there was an issue with the pharmacy only providing a one-month supply instead of a 90-day supply.    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         Past Medical History:       Past Medical History:   Diagnosis Date    Cataract     Dry eyes     Encounter for general adult medical examination without abnormal findings 09/18/2021    Encounter for preventive health examination    Glaucoma suspect of both eyes     Malignant neoplasm of unspecified fallopian tube (Multi) 03/25/2015    Fallopian tube carcinoma    Nevus of Blayne     Personal history of other benign neoplasm     History of uterine leiomyoma    Personal history of other diseases of urinary system     History of renal failure    PVD (posterior vitreous detachment), right eye     Retinal drusen of both eyes           Past Surgical History:       Past Surgical History:   Procedure Laterality Date    CATARACT EXTRACTION      CHOLECYSTECTOMY  12/31/2015    Cholecystectomy Laparoscopic    EXPLORATORY LAPAROTOMY  12/31/2015    Exploratory Laparotomy    OTHER SURGICAL HISTORY  03/23/2017    Breast Reconstruction With Tissue Expander Bilateral    SMALL INTESTINE SURGERY  06/03/2019    Small Bowel Resection     TOTAL ABDOMINAL HYSTERECTOMY W/ BILATERAL SALPINGOOPHORECTOMY  06/03/2019    Total Abdominal Hysterectomy With Removal Of Both Ovaries    US GUIDED ABDOMINAL PARACENTESIS  05/23/2013    US GUIDED ABDOMINAL PARACENTESIS 5/23/2013 [facility] CLINICAL LEGACY    US GUIDED ABSCESS DRAIN  05/28/2013    US GUIDED ABSCESS DRAIN 5/28/2013 [facility] CLINICAL LEGACY         Medications:       Prior to Admission medications    Medication Sig Start Date End Date Taking? Authorizing Provider   amLODIPine (Norvasc) 10 mg tablet TAKE ONE TABLET BY MOUTH EVERY DAY 12/11/23   CHRISTIAN Mattson   cholecalciferol (Vitamin D-3) 50 MCG (2000 UT) tablet Take 1 tablet (2,000 Units) by mouth once daily. 4/16/19   Historical Provider, MD   diphenoxylate-atropine (Lomotil) 2.5-0.025 mg tablet TAKE ONE TABLET BY MOUTH TWICE A DAY 2/19/25   Ronda Butts MD   estradiol (Vagifem) 10 mcg tablet vaginal tablet Insert 1 tablet (10 mcg) into the vagina 2 times a week. 9/16/24   Ronda Butts MD   hydrocortisone 2.5 % cream Apply 1 Application topically if needed. 8/31/23   Historical Provider, MD   lisinopril 40 mg tablet TAKE ONE TABLET BY MOUTH EVERY DAY 10/6/23   CHRISTIAN Mattson   metoprolol succinate XL (Toprol-XL) 100 mg 24 hr tablet Take 1 tablet (100 mg) by mouth once daily. 12/17/14   Historical Provider, MD   naproxen (Naprosyn) 500 mg tablet Take 1 tablet (500 mg) by mouth every 12 hours.  Patient not taking: Reported on 12/12/2024 6/2/22   Historical Provider, MD   predniSONE (Deltasone) 10 mg tablet Take 1 tablet (10 mg) by mouth once daily.  Patient not taking: Reported on 12/12/2024 3/15/23   Historical Provider, MD   sodium,potassium,mag sulfates (Suprep) 17.5-3.13-1.6 gram solution Take one bottle beginning at 6pm night before procedure and then take the other bottle 5 hours before procedure time as directed per instruction sheet 1/28/25   Adelso Gaming MD   tretinoin (Retin-A) 0.025 % cream Apply 1  "Application topically once daily at bedtime.  Patient not taking: Reported on 12/12/2024 2/9/22   Historical Provider, MD RAVI  Review of Systems       Data and DIAGNOSTIC STUDIES REVIEWED   Imaging  No results found.    Cardiology, Vascular, and Other Imaging  No other imaging results found for the past 7 days     No results found for: \"URINECULTURE\", \"UAMICCOMM\"   Lab Results   Component Value Date    GLUCOSE 107 (H) 01/03/2025    CALCIUM 9.4 01/03/2025     01/03/2025    K 4.1 01/03/2025    CO2 24 01/03/2025     01/03/2025    BUN 19 01/03/2025    CREATININE 1.03 01/03/2025     Lab Results   Component Value Date    WBC 3.3 (L) 01/03/2025    HGB 12.5 01/03/2025    HCT 37.9 01/03/2025    MCV 94 01/03/2025     01/03/2025            "

## 2025-05-09 ENCOUNTER — APPOINTMENT (OUTPATIENT)
Dept: OTOLARYNGOLOGY | Facility: CLINIC | Age: 70
End: 2025-05-09
Payer: COMMERCIAL

## 2025-05-09 VITALS — HEIGHT: 67 IN | BODY MASS INDEX: 25.11 KG/M2 | WEIGHT: 160 LBS

## 2025-05-09 DIAGNOSIS — H61.23 BILATERAL IMPACTED CERUMEN: Primary | ICD-10-CM

## 2025-05-09 DIAGNOSIS — H93.8X2 SENSATION OF FULLNESS IN LEFT EAR: ICD-10-CM

## 2025-05-09 PROCEDURE — 1160F RVW MEDS BY RX/DR IN RCRD: CPT | Performed by: NURSE PRACTITIONER

## 2025-05-09 PROCEDURE — 69210 REMOVE IMPACTED EAR WAX UNI: CPT | Performed by: NURSE PRACTITIONER

## 2025-05-09 PROCEDURE — 99203 OFFICE O/P NEW LOW 30 MIN: CPT | Performed by: NURSE PRACTITIONER

## 2025-05-09 PROCEDURE — 3008F BODY MASS INDEX DOCD: CPT | Performed by: NURSE PRACTITIONER

## 2025-05-09 PROCEDURE — 1036F TOBACCO NON-USER: CPT | Performed by: NURSE PRACTITIONER

## 2025-05-09 PROCEDURE — 1159F MED LIST DOCD IN RCRD: CPT | Performed by: NURSE PRACTITIONER

## 2025-05-09 NOTE — PROGRESS NOTES
Subjective   Patient ID: Dian Gilliam is a 69 y.o. female who presents for ear cleaning .  HPI  This patient is referred for evaluation of a sensation of left clogged ear.  The patient is not accompanied by anyone.   When asked about ear pain, hearing loss, itching, discharge from ear, tinnitus, aural fullness or autophony, the patient admits to left aural fullness.   The patient does not wear a hearing aid.  When asked about a significant past otological history including history of prior ear surgery, noise exposure, exposure to ototoxic drugs or agents, and/or family history of hearing loss, the patient admits to none.    Review of Systems  A comprehensive or 10 points review of the patient's constitutional, neurological, HEENT, pulmonary, cardiovascular and genito-urinary systems showed only those mentioned in history of present illness.    Objective   Physical Exam  Constitutional: no fever, chills, weight loss or weight gain   General appearance: Appears well, well-nourished, well groomed. No acute distress.   Communication: Normal communication   Psychiatric: Oriented to person, place and time. Normal mood and affect.   Neurologic: Cranial nerves II-XII grossly intact and symmetric bilaterally.   Head and Face:   Head: Atraumatic with no masses, lesions or scarring.   Face: Normal symmetry, no paralysis, synkinesis or facial tic. No scars or deformities.     Eyes: Conjunctiva not edematous or erythematous   Ears: External inspection of ears with no deformity, scars or masses.  Bilateral canals with cerumen impactions     Neck: Normal appearing, symmetric, trachea midline.   Cardiovascular: Examination of peripheral vascular system shows no clubbing or cyanosis.   Respiratory: No respiratory distress increased work of breathing. Inspection of the chest with symmetric chest expansion and normal respiratory effort.   Skin: No rashes in the head or neck    Assessment/Plan     This patient presents for initial  evaluation of acute acquired bilateral cerumen impaction and left aural fullness.    Reassurance given that otologic exam is normal after cleaning.  There was significantly more cerumen on the left side.  Patient reports resolution of symptoms after cleaning.  She may follow-up as needed.  All questions were answered to patient's satisfaction.    This note was created using speech recognition transcription software. Despite proofreading, several typographical errors might be present that might affect the meaning of the content. Please call with any questions.  Patient ID: Dian Gilliam is a 69 y.o. female.    Ear cerumen removal    Date/Time: 5/9/2025 10:23 AM    Performed by: CHRISTIAN Greer  Authorized by: CHRISTIAN Greer    Consent:     Consent obtained:  Verbal    Consent given by:  Patient    Risks discussed:  Pain    Alternatives discussed:  No treatment  Procedure details:     Location:  L ear and R ear    Procedure type: curette      Procedure type comment:  Alligator, suction    Procedure outcomes: cerumen removed    Post-procedure details:     Inspection:  No bleeding, ear canal clear and TM intact    Hearing quality:  Improved    Procedure completion:  Tolerated well, no immediate complications  Comments:      Significant more cerumen removed from left canal.         CHRISTIAN Greer 05/09/25 10:21 AM

## 2025-06-05 ENCOUNTER — APPOINTMENT (OUTPATIENT)
Dept: GYNECOLOGIC ONCOLOGY | Facility: CLINIC | Age: 70
End: 2025-06-05
Payer: COMMERCIAL
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
## 2025-06-12 ENCOUNTER — OFFICE VISIT (OUTPATIENT)
Dept: GYNECOLOGIC ONCOLOGY | Facility: CLINIC | Age: 70
End: 2025-06-12
Payer: COMMERCIAL

## 2025-06-12 VITALS
WEIGHT: 160.4 LBS | DIASTOLIC BLOOD PRESSURE: 78 MMHG | OXYGEN SATURATION: 94 % | RESPIRATION RATE: 18 BRPM | TEMPERATURE: 97.5 F | SYSTOLIC BLOOD PRESSURE: 118 MMHG | HEART RATE: 55 BPM | BODY MASS INDEX: 25.12 KG/M2

## 2025-06-12 DIAGNOSIS — Z85.43 ENCOUNTER FOR FOLLOW-UP SURVEILLANCE OF OVARIAN CANCER: ICD-10-CM

## 2025-06-12 DIAGNOSIS — Z15.09 BRCA2 POSITIVE: ICD-10-CM

## 2025-06-12 DIAGNOSIS — Z08 ENCOUNTER FOR FOLLOW-UP SURVEILLANCE OF OVARIAN CANCER: ICD-10-CM

## 2025-06-12 DIAGNOSIS — Z15.01 BRCA2 POSITIVE: ICD-10-CM

## 2025-06-12 DIAGNOSIS — C57.00 CARCINOMA OF FALLOPIAN TUBE, UNSPECIFIED LATERALITY: Primary | ICD-10-CM

## 2025-06-12 PROCEDURE — 3074F SYST BP LT 130 MM HG: CPT | Performed by: STUDENT IN AN ORGANIZED HEALTH CARE EDUCATION/TRAINING PROGRAM

## 2025-06-12 PROCEDURE — 3078F DIAST BP <80 MM HG: CPT | Performed by: STUDENT IN AN ORGANIZED HEALTH CARE EDUCATION/TRAINING PROGRAM

## 2025-06-12 PROCEDURE — 99214 OFFICE O/P EST MOD 30 MIN: CPT | Performed by: STUDENT IN AN ORGANIZED HEALTH CARE EDUCATION/TRAINING PROGRAM

## 2025-06-12 PROCEDURE — G2211 COMPLEX E/M VISIT ADD ON: HCPCS | Performed by: STUDENT IN AN ORGANIZED HEALTH CARE EDUCATION/TRAINING PROGRAM

## 2025-06-12 PROCEDURE — 1159F MED LIST DOCD IN RCRD: CPT | Performed by: STUDENT IN AN ORGANIZED HEALTH CARE EDUCATION/TRAINING PROGRAM

## 2025-06-12 PROCEDURE — 1160F RVW MEDS BY RX/DR IN RCRD: CPT | Performed by: STUDENT IN AN ORGANIZED HEALTH CARE EDUCATION/TRAINING PROGRAM

## 2025-06-12 PROCEDURE — 1126F AMNT PAIN NOTED NONE PRSNT: CPT | Performed by: STUDENT IN AN ORGANIZED HEALTH CARE EDUCATION/TRAINING PROGRAM

## 2025-06-12 ASSESSMENT — PAIN SCALES - GENERAL: PAINLEVEL_OUTOF10: 0-NO PAIN

## 2025-06-29 DIAGNOSIS — R15.9 FULL INCONTINENCE OF FECES: ICD-10-CM

## 2025-06-29 DIAGNOSIS — R19.7 DIARRHEA, UNSPECIFIED TYPE: ICD-10-CM

## 2025-06-29 NOTE — PROGRESS NOTES
Urogynecology  Provider:  Ronda Butts MD  [phone]      ASSESSMENT AND PLAN:   69 y.o. female with FI, chronic diarrhea, and vaginal atrophy. Follows with Dr. Chao for surveillance due to hx of stage I fallopian tube cancer diagnosed in 2013.     Diagnoses:   #1 Fecal incontinence  #2 Chronic diarrhea  #3 Vaginal atrophy    Plan:   1. FI, chronic diarrhea   - It is fine to use OTC Imodium. Advised against using Imodium in addition to Lomotil to avoid constipation.  - Continue Lomotil as prescribed for long-term control of diarrhea.  - Refilled Lomotil rx for patient.     2. Vaginal atrophy   - Continue Vagifem.      Follow-up in person in September with Dr. Butts to re-sign her contract.     Scribe Attestation:   IJoy, am scribing for virtually, and in the presence of Ronda Butts MD on 7/1/25 at 10:18 AM.     Phone call visit today: The patient's identity was confirmed and that she is located in [place]. Ronda Butts MD identified herself and the patient consented to a telehealth visit today. Phone call time: 9 minutes    Virtual or Telephone Consent    While technically available, the patient was unable or unwilling to consent to connect via audio/video telehealth technology; therefore, I performed this visit using a real-time audio only connection between Dian Gilliam & Ronda Butts MD.  Verbal consent was requested and obtained from Dian Gilliam on this date, 6/30/25 for a telehealth visit and the patient's location was confirmed at the time of the visit.    Agree with above. I Dr. Butts, personally performed the services described in the documentation which was scribed virtually and confirm it is both complete and accurate.  Ronda Butts MD      Problem List Items Addressed This Visit    None          I spent a total of 12 minutes in face to face and non face to face time at this virtual visit.          Ronda Butts MD        HISTORY OF PRESENT ILLNESS:    Dian Gilliam is a 69 y.o. female who presents for follow up.     Last visit 4/10/25  3 months check in  69-year-old female being assessed for FI, chronic diarrhea, and vaginal atrophy.     Diagnoses:  #1 Fecal incontinence  #2 Chronic diarrhea  #3 Vaginal atrophy     Plan:  FI, chronic diarrhea  - Due for Lomotil refill on 4/18/2025.  > Script placed with 4/18/25 fill date.     2. Vaginal atrophy  - Vagifem refilled.  - She experienced issues with insurance only covering a 30-day supply instead of 90 days.  - Will send a prescription to [facility] for a 90-day supply with 2 refills of Vagifem.     Vagifem refilled. Due for lomotil refill on 4/18/25. Script placed fwith 4/18/25 fill date. I have personally reviewed the OARRS report for. This report is scanned into the electronic medical record. I have considered the risks of abuse, dependence, addiction and diversion.        Interval History:   - The patient reports experiencing an upset stomach and diarrhea after drinking root beer a few weeks ago. Other than this episode, she has been doing well.   - Patient asked about OTC Imodium.   - She does use her Vagifem.   - Patient is considering removal of silicone breast implants.         Past Medical History:       Medical History[1]       Past Surgical History:       Surgical History[2]      Medications:       Prior to Admission medications    Medication Sig Start Date End Date Taking? Authorizing Provider   amLODIPine (Norvasc) 10 mg tablet TAKE ONE TABLET BY MOUTH EVERY DAY 12/11/23   PEDRO Mattson-CNP   cholecalciferol (Vitamin D-3) 50 MCG (2000 UT) tablet Take 1 tablet (2,000 Units) by mouth once daily. 4/16/19   Historical Provider, MD   diphenoxylate-atropine (Lomotil) 2.5-0.025 mg tablet Take 1 tablet by mouth 2 times a day. Do not fill before April 18, 2025. 4/18/25   Ronda Butts MD   estradiol (Vagifem) 10 mcg tablet vaginal tablet Insert 1 tablet (10 mcg) into the vagina 2 times a  "week. 4/10/25   Ronda Butts MD   lisinopril 40 mg tablet TAKE ONE TABLET BY MOUTH EVERY DAY 10/6/23   Ailyn Valencia APRN-CNP   metoprolol succinate XL (Toprol-XL) 100 mg 24 hr tablet Take 1 tablet (100 mg) by mouth once daily. 12/17/14   Historical Provider, MD RAVI  Review of Systems       Data and DIAGNOSTIC STUDIES REVIEWED   Imaging  No results found.    Cardiology, Vascular, and Other Imaging  No other imaging results found for the past 7 days     No results found for: \"URINECULTURE\", \"UAMICCOMM\"   Lab Results   Component Value Date    GLUCOSE 107 (H) 01/03/2025    CALCIUM 9.4 01/03/2025     01/03/2025    K 4.1 01/03/2025    CO2 24 01/03/2025     01/03/2025    BUN 19 01/03/2025    CREATININE 1.03 01/03/2025     Lab Results   Component Value Date    WBC 3.3 (L) 01/03/2025    HGB 12.5 01/03/2025    HCT 37.9 01/03/2025    MCV 94 01/03/2025     01/03/2025                 [1]   Past Medical History:  Diagnosis Date    Cataract     Dry eyes     Encounter for general adult medical examination without abnormal findings 09/18/2021    Encounter for preventive health examination    Glaucoma suspect of both eyes     Malignant neoplasm of unspecified fallopian tube (Multi) 03/25/2015    Fallopian tube carcinoma    Nevus of Blayne     Personal history of other benign neoplasm     History of uterine leiomyoma    Personal history of other diseases of urinary system     History of renal failure    PVD (posterior vitreous detachment), right eye     Retinal drusen of both eyes    [2]   Past Surgical History:  Procedure Laterality Date    CATARACT EXTRACTION      CHOLECYSTECTOMY  12/31/2015    Cholecystectomy Laparoscopic    EXPLORATORY LAPAROTOMY  12/31/2015    Exploratory Laparotomy    OTHER SURGICAL HISTORY  03/23/2017    Breast Reconstruction With Tissue Expander Bilateral    SMALL INTESTINE SURGERY  06/03/2019    Small Bowel Resection    TOTAL ABDOMINAL HYSTERECTOMY W/ BILATERAL " SALPINGOOPHORECTOMY  06/03/2019    Total Abdominal Hysterectomy With Removal Of Both Ovaries    US GUIDED ABDOMINAL PARACENTESIS  05/23/2013    US GUIDED ABDOMINAL PARACENTESIS 5/23/2013 [facility] CLINICAL LEGACY    US GUIDED ABSCESS DRAIN  05/28/2013    US GUIDED ABSCESS DRAIN 5/28/2013 [facility] CLINICAL LEGACY

## 2025-06-30 ENCOUNTER — TELEMEDICINE (OUTPATIENT)
Dept: OBSTETRICS AND GYNECOLOGY | Facility: CLINIC | Age: 70
End: 2025-06-30
Payer: COMMERCIAL

## 2025-06-30 DIAGNOSIS — R15.9 FULL INCONTINENCE OF FECES: ICD-10-CM

## 2025-06-30 DIAGNOSIS — R19.7 DIARRHEA, UNSPECIFIED TYPE: ICD-10-CM

## 2025-06-30 RX ORDER — DIPHENOXYLATE HYDROCHLORIDE AND ATROPINE SULFATE 2.5; .025 MG/1; MG/1
1 TABLET ORAL 2 TIMES DAILY
Qty: 60 TABLET | Refills: 2 | Status: SHIPPED | OUTPATIENT
Start: 2025-06-30

## 2025-07-01 RX ORDER — DIPHENOXYLATE HYDROCHLORIDE AND ATROPINE SULFATE 2.5; .025 MG/1; MG/1
1 TABLET ORAL 2 TIMES DAILY
Qty: 60 TABLET | Refills: 2 | Status: SHIPPED | OUTPATIENT
Start: 2025-07-01

## 2026-01-12 ENCOUNTER — APPOINTMENT (OUTPATIENT)
Dept: OPHTHALMOLOGY | Facility: CLINIC | Age: 71
End: 2026-01-12
Payer: COMMERCIAL